# Patient Record
Sex: FEMALE | Race: WHITE | Employment: OTHER | URBAN - METROPOLITAN AREA
[De-identification: names, ages, dates, MRNs, and addresses within clinical notes are randomized per-mention and may not be internally consistent; named-entity substitution may affect disease eponyms.]

---

## 2017-01-11 PROBLEM — E55.9 HYPOVITAMINOSIS D: Status: ACTIVE | Noted: 2017-01-11

## 2017-03-23 DIAGNOSIS — J45.909 RAD (REACTIVE AIRWAY DISEASE), UNSPECIFIED ASTHMA SEVERITY, UNCOMPLICATED: ICD-10-CM

## 2017-03-28 RX ORDER — TIOTROPIUM BROMIDE 18 UG/1
CAPSULE ORAL; RESPIRATORY (INHALATION)
Qty: 30 CAP | Refills: 3 | Status: SHIPPED | OUTPATIENT
Start: 2017-03-28 | End: 2017-07-13 | Stop reason: SDUPTHER

## 2017-05-12 DIAGNOSIS — J44.9 CHRONIC OBSTRUCTIVE PULMONARY DISEASE, UNSPECIFIED COPD TYPE (HCC): ICD-10-CM

## 2017-05-13 RX ORDER — BUDESONIDE AND FORMOTEROL FUMARATE DIHYDRATE 160; 4.5 UG/1; UG/1
AEROSOL RESPIRATORY (INHALATION)
Qty: 1 INHALER | Refills: 2 | Status: SHIPPED | OUTPATIENT
Start: 2017-05-13 | End: 2017-08-18 | Stop reason: SDUPTHER

## 2017-06-01 ENCOUNTER — LAB ONLY (OUTPATIENT)
Dept: INTERNAL MEDICINE CLINIC | Age: 77
End: 2017-06-01

## 2017-06-01 ENCOUNTER — HOSPITAL ENCOUNTER (OUTPATIENT)
Dept: LAB | Age: 77
Discharge: HOME | End: 2017-06-01

## 2017-06-01 DIAGNOSIS — M85.80 OSTEOPENIA: ICD-10-CM

## 2017-06-01 DIAGNOSIS — Z79.899 ENCOUNTER FOR LONG-TERM (CURRENT) USE OF MEDICATIONS: ICD-10-CM

## 2017-06-01 DIAGNOSIS — R73.9 HYPERGLYCEMIA: ICD-10-CM

## 2017-06-01 DIAGNOSIS — E78.00 PURE HYPERCHOLESTEROLEMIA: ICD-10-CM

## 2017-06-01 DIAGNOSIS — I10 ESSENTIAL HYPERTENSION: ICD-10-CM

## 2017-06-02 LAB
25(OH)D3+25(OH)D2 SERPL-MCNC: 57 NG/ML (ref 30–100)
ALBUMIN SERPL-MCNC: 4.1 G/DL (ref 3.5–4.8)
ALBUMIN/GLOB SERPL: 1.8 {RATIO} (ref 1.2–2.2)
ALP SERPL-CCNC: 63 IU/L (ref 39–117)
ALT SERPL-CCNC: 18 IU/L (ref 0–32)
AST SERPL-CCNC: 20 IU/L (ref 0–40)
BASOPHILS # BLD AUTO: 0.1 X10E3/UL (ref 0–0.2)
BASOPHILS NFR BLD AUTO: 2 %
BILIRUB SERPL-MCNC: 0.3 MG/DL (ref 0–1.2)
BUN SERPL-MCNC: 11 MG/DL (ref 8–27)
BUN/CREAT SERPL: 14 (ref 12–28)
CALCIUM SERPL-MCNC: 8.9 MG/DL (ref 8.7–10.3)
CHLORIDE SERPL-SCNC: 100 MMOL/L (ref 96–106)
CHOLEST SERPL-MCNC: 168 MG/DL (ref 100–199)
CO2 SERPL-SCNC: 24 MMOL/L (ref 18–29)
CREAT SERPL-MCNC: 0.8 MG/DL (ref 0.57–1)
EOSINOPHIL # BLD AUTO: 0.2 X10E3/UL (ref 0–0.4)
EOSINOPHIL NFR BLD AUTO: 4 %
ERYTHROCYTE [DISTWIDTH] IN BLOOD BY AUTOMATED COUNT: 13.3 % (ref 12.3–15.4)
GLOBULIN SER CALC-MCNC: 2.3 G/DL (ref 1.5–4.5)
GLUCOSE SERPL-MCNC: 79 MG/DL (ref 65–99)
HBA1C MFR BLD: 5.9 % (ref 4.8–5.6)
HCT VFR BLD AUTO: 40.7 % (ref 34–46.6)
HDLC SERPL-MCNC: 80 MG/DL
HGB BLD-MCNC: 13.1 G/DL (ref 11.1–15.9)
IMM GRANULOCYTES # BLD: 0 X10E3/UL (ref 0–0.1)
IMM GRANULOCYTES NFR BLD: 0 %
INTERPRETATION, 910389: NORMAL
LDLC SERPL CALC-MCNC: 68 MG/DL (ref 0–99)
LYMPHOCYTES # BLD AUTO: 1.2 X10E3/UL (ref 0.7–3.1)
LYMPHOCYTES NFR BLD AUTO: 26 %
MCH RBC QN AUTO: 30.8 PG (ref 26.6–33)
MCHC RBC AUTO-ENTMCNC: 32.2 G/DL (ref 31.5–35.7)
MCV RBC AUTO: 96 FL (ref 79–97)
MONOCYTES # BLD AUTO: 0.4 X10E3/UL (ref 0.1–0.9)
MONOCYTES NFR BLD AUTO: 8 %
NEUTROPHILS # BLD AUTO: 2.8 X10E3/UL (ref 1.4–7)
NEUTROPHILS NFR BLD AUTO: 60 %
PLATELET # BLD AUTO: 239 X10E3/UL (ref 150–379)
POTASSIUM SERPL-SCNC: 4 MMOL/L (ref 3.5–5.2)
PROT SERPL-MCNC: 6.4 G/DL (ref 6–8.5)
RBC # BLD AUTO: 4.25 X10E6/UL (ref 3.77–5.28)
SODIUM SERPL-SCNC: 140 MMOL/L (ref 134–144)
T4 FREE SERPL-MCNC: 1.17 NG/DL (ref 0.82–1.77)
TRIGL SERPL-MCNC: 101 MG/DL (ref 0–149)
TSH SERPL DL<=0.005 MIU/L-ACNC: 3.19 UIU/ML (ref 0.45–4.5)
VLDLC SERPL CALC-MCNC: 20 MG/DL (ref 5–40)
WBC # BLD AUTO: 4.6 X10E3/UL (ref 3.4–10.8)

## 2017-06-02 PROCEDURE — 99001 SPECIMEN HANDLING PT-LAB: CPT | Performed by: HOSPITALIST

## 2017-06-12 ENCOUNTER — OFFICE VISIT (OUTPATIENT)
Dept: INTERNAL MEDICINE CLINIC | Age: 77
End: 2017-06-12

## 2017-06-12 VITALS
HEART RATE: 78 BPM | TEMPERATURE: 98.1 F | WEIGHT: 142.4 LBS | SYSTOLIC BLOOD PRESSURE: 128 MMHG | RESPIRATION RATE: 16 BRPM | BODY MASS INDEX: 23.72 KG/M2 | DIASTOLIC BLOOD PRESSURE: 70 MMHG | OXYGEN SATURATION: 99 % | HEIGHT: 65 IN

## 2017-06-12 DIAGNOSIS — E78.00 PURE HYPERCHOLESTEROLEMIA: ICD-10-CM

## 2017-06-12 DIAGNOSIS — E55.9 HYPOVITAMINOSIS D: ICD-10-CM

## 2017-06-12 DIAGNOSIS — I10 ESSENTIAL HYPERTENSION: ICD-10-CM

## 2017-06-12 DIAGNOSIS — J44.1 CHRONIC OBSTRUCTIVE PULMONARY DISEASE WITH ACUTE EXACERBATION (HCC): ICD-10-CM

## 2017-06-12 DIAGNOSIS — R73.03 PREDIABETES: Primary | ICD-10-CM

## 2017-06-12 DIAGNOSIS — R91.8 PULMONARY NODULES: ICD-10-CM

## 2017-06-12 DIAGNOSIS — J45.30 RAD (REACTIVE AIRWAY DISEASE), MILD PERSISTENT, UNCOMPLICATED: ICD-10-CM

## 2017-06-12 RX ORDER — ALBUTEROL SULFATE 90 UG/1
1 AEROSOL, METERED RESPIRATORY (INHALATION)
Qty: 1 INHALER | Refills: 3 | Status: SHIPPED | OUTPATIENT
Start: 2017-06-12

## 2017-06-12 RX ORDER — ALBUTEROL 2 MG/1
2 TABLET ORAL
Qty: 60 TAB | Refills: 3 | Status: SHIPPED | OUTPATIENT
Start: 2017-06-12 | End: 2019-05-21

## 2017-06-12 NOTE — PROGRESS NOTES
Chief Complaint   Patient presents with    Hypertension    Fatigue    Cholesterol Problem    Results     Lab F/U   Patient is here for 6mth F/U.  1. Have you been to the ER, urgent care clinic since your last visit? Hospitalized since your last visit? No    2. Have you seen or consulted any other health care providers outside of the [address] [place] [address] since your last visit? Include any pap smears or colon screening. [facility] Dr Colonel Jovel.

## 2017-06-12 NOTE — PROGRESS NOTES
Chief Complaint   Patient presents with    Hypertension    Fatigue    Cholesterol Problem    Results     Lab F/U       HPI:   Patient is a 64year old  female with medical problems listed below presents today for follow up of Hypertension, Hyperlipidemia, Prediabetes, Hypovitaminosis D, COPD, etc. She has been feeling well and voices no complaints today. She is complaint with her medications with no adverse effects reported. Her COPD has improved as she continues to take Spiriva and Symbicort together and is followed by pulmonologist Dr. Fco Hansen with [facility] with her next appointment on 6/30/17. She is requesting refill of some medications today. She continues to maintain an active lifestyle via swimming and walking daily. She was noted with lung nodules on her last CT chest and her pulmonologist requested that I order a repeat CT chest with contrast to follow up on the lung nodules. Past Medical History:   Diagnosis Date    Allergic rhinitis     AR (allergic rhinitis) 11/16/2011    Cataract     Chronic sinusitis     Hyperlipidaemia     Hyperlipidemia 11/16/2011    Hypertension     Mild Diastolic    Low ferritin level     Osteopenia 11/16/2011    Osteoporosis     Pneumonia     Vitamin D deficiencies     Mild     Allergies   Allergen Reactions    Augmentin [Amoxicillin-Pot Clavulanate] Diarrhea     severe     Current Outpatient Prescriptions   Medication Sig Dispense Refill    SYMBICORT 160-4.5 mcg/actuation HFA inhaler INHALE 2 PUFFS BY MOUTH TWICE DAILY 1 Inhaler 2    SPIRIVA WITH HANDIHALER 18 mcg inhalation capsule INHALE THE CONTENTS OF 1 CAPSULE VIA INHALATION DEVICE DAILY 30 Cap 3    simvastatin (ZOCOR) 20 mg tablet Take 1 Tab by mouth nightly. 90 Tab 3    fluticasone (FLONASE) 50 mcg/actuation nasal spray 2 Sprays by Both Nostrils route daily.  Indications: ALLERGIC RHINITIS 3 Bottle 3    amLODIPine (NORVASC) 5 mg tablet TAKE 1 TABLET DAILY FOR HYPERTENSION 90 Tab 3    albuterol (PROVENTIL HFA, VENTOLIN HFA, PROAIR HFA) 90 mcg/actuation inhaler Take 1 Puff by inhalation every four (4) hours as needed for Wheezing. 1 Inhaler 2    albuterol (PROVENTIL) 2 mg tablet Take 1 Tab by mouth every eight (8) hours as needed. 60 Tab 0    MULTIVIT-MIN/FA/LUTEIN/ZEAXANT (ICAPS MV PO) Take 1 Tab by mouth four (4) times daily.  glucosamine 1,000 mg Tab Take 1,000 mg by mouth daily.  Cholecalciferol, Vitamin D3, (VITAMIN D) 2,000 unit Cap Take  by mouth daily.  calcium-vitamin D (CALCIUM 500+D) 500 mg(1,250mg) -200 unit per tablet Take 1 Tab by mouth two (2) times daily (with meals). With magnesium       fish oil-dha-epa (FISH OIL) 1,200-144-216 mg Cap Take 2 Caps by mouth daily.  STRONTIUM GLUCONATE-B6-B12-FA PO Take 1 Tab by mouth two (2) times a day. ROS:  Constitutional: Negative for fever, chills, or fatigue  Neurological: Negative for headache, dizziness, visual disturbance, or loss of conciousness  Respiratory: Negative for SOB or cough.    Cardiovascular: Negative for chest pain, palpitation, or leg swelling  Gastrointestinal: Negative for abdominal pain, nausea, vomiting, diarrhea, blood in stool, melena, or heartburn  Musculoskeletal: Negative for falls      Physical Exam:  Visit Vitals    /70 (BP 1 Location: Right arm, BP Patient Position: Sitting)    Pulse 78    Temp 98.1 °F (36.7 °C) (Oral)    Resp 16    Ht 5' 5\" (1.651 m)    Wt 142 lb 6.4 oz (64.6 kg)    SpO2 99%    BMI 23.7 kg/m2     Constitutional: a & o x 3, in no acute distress, well nourished, and interacting appropriately  Respiratory: symmetrical chest expansion, lungs clear to auscultation bilaterally, good air entry, normal respiratory effort, no wheezes or crackles  CV:normal S1S2, regular rate and rhythm, no murmurs, no carotid bruits, no JVD, pulses palpable, no thrills  ABD: Soft, non tender, non distended, no organomegaly, positive bowel sounds  EXT: No edema noted on bilateral lower extremity      Assessment/Plan:    ICD-10-CM ICD-9-CM    1. Prediabetes R73.03 790.29 Recent HBA1C is 5.9. Counseled on diet and exercise. HEMOGLOBIN A1C W/O EAG   2. Essential hypertension I10 401.9 Controlled  Continue current meds and she was counseled on low salt diet. CBC WITH AUTOMATED DIFF      METABOLIC PANEL, COMPREHENSIVE      T4, FREE      TSH 3RD GENERATION   3. Pure hypercholesterolemia E78.00 272.0 Recent lipid panel done 6/1/17 reviewed with pt and improved revealing Cho 168 and LDL 68. Continue Simvastatin 20 mg daily and she was counseled on low fat diet. LIPID PANEL   4. Hypovitaminosis D E55.9 268.9 Recent Vit D is good. VITAMIN D, 25 HYDROXY   5. Chronic obstructive pulmonary disease with acute exacerbation (HCC) J44.1 491.21 Stable. Continue current meds and [facility] DrGenaro Moss  albuterol (PROVENTIL HFA, VENTOLIN HFA, PROAIR HFA) 90 mcg/actuation inhaler   6. RAD (reactive airway disease), mild persistent, uncomplicated Z21.37 256.43 albuterol (PROVENTIL) 2 mg tablet   7. Pulmonary nodules R91.8 793.19 CT CHEST W CONT         Orders Placed This Encounter    CT CHEST W CONT     Standing Status:   Future     Standing Expiration Date:   7/12/2018     Order Specific Question:   Is Patient Allergic to Contrast Dye?      Answer:   Unknown    CBC WITH AUTOMATED DIFF     Standing Status:   Future     Standing Expiration Date:   1/8/2018    HEMOGLOBIN A1C W/O EAG     Standing Status:   Future     Standing Expiration Date:   6/13/2018    LIPID PANEL     Standing Status:   Future     Standing Expiration Date:   5/6/5756    METABOLIC PANEL, COMPREHENSIVE     Standing Status:   Future     Standing Expiration Date:   1/8/2018    T4, FREE     Standing Status:   Future     Standing Expiration Date:   1/8/2018    TSH 3RD GENERATION     Standing Status:   Future     Standing Expiration Date:   1/8/2018    VITAMIN D, 25 HYDROXY     Standing Status:   Future     Standing Expiration Date:   12/30/2017    albuterol (PROVENTIL HFA, VENTOLIN HFA, PROAIR HFA) 90 mcg/actuation inhaler     Sig: Take 1 Puff by inhalation every four (4) hours as needed for Wheezing. Dispense:  1 Inhaler     Refill:  3    albuterol (PROVENTIL) 2 mg tablet     Sig: Take 1 Tab by mouth every eight (8) hours as needed. Dispense:  60 Tab     Refill:  3           Recent labs reviewed with pt       Additional Notes: Discussed today's diagnosis and treatment plans. Medication indications and adverse effects discussed. After Visit Summary: Provided and discussed printed patient instructions. Questions in relation to diagnosis answered. Follow-up disposition:  In 6 months with labs 1 week prior        Alexander Montesinos DO, MPH  Internal Medicine

## 2017-06-12 NOTE — PATIENT INSTRUCTIONS
A Healthy Lifestyle: Care Instructions  Your Care Instructions  A healthy lifestyle can help you feel good, stay at a healthy weight, and have plenty of energy for both work and play. A healthy lifestyle is something you can share with your whole family. A healthy lifestyle also can lower your risk for serious health problems, such as high blood pressure, heart disease, and diabetes. You can follow a few steps listed below to improve your health and the health of your family. Follow-up care is a key part of your treatment and safety. Be sure to make and go to all appointments, and call your doctor if you are having problems. Its also a good idea to know your test results and keep a list of the medicines you take. How can you care for yourself at home? · Do not eat too much sugar, fat, or fast foods. You can still have dessert and treats now and then. The goal is moderation. · Start small to improve your eating habits. Pay attention to portion sizes, drink less juice and soda pop, and eat more fruits and vegetables. ¨ Eat a healthy amount of food. A 3-ounce serving of meat, for example, is about the size of a deck of cards. Fill the rest of your plate with vegetables and whole grains. ¨ Limit the amount of soda and sports drinks you have every day. Drink more water when you are thirsty. ¨ Eat at least 5 servings of fruits and vegetables every day. It may seem like a lot, but it is not hard to reach this goal. A serving or helping is 1 piece of fruit, 1 cup of vegetables, or 2 cups of leafy, raw vegetables. Have an apple or some carrot sticks as an afternoon snack instead of a candy bar. Try to have fruits and/or vegetables at every meal.  · Make exercise part of your daily routine. You may want to start with simple activities, such as walking, bicycling, or slow swimming. Try to be active 30 to 60 minutes every day. You do not need to do all 30 to 60 minutes all at once.  For example, you can exercise 3 times a day for 10 or 20 minutes. Moderate exercise is safe for most people, but it is always a good idea to talk to your doctor before starting an exercise program.  · Keep moving. Myron Crooked the lawn, work in the garden, or [employer]. Take the stairs instead of the elevator at work. · If you smoke, quit. People who smoke have an increased risk for heart attack, stroke, cancer, and other lung illnesses. Quitting is hard, but there are ways to boost your chance of quitting tobacco for good. ¨ Use nicotine gum, patches, or lozenges. ¨ Ask your doctor about stop-smoking programs and medicines. ¨ Keep trying. In addition to reducing your risk of diseases in the future, you will notice some benefits soon after you stop using tobacco. If you have shortness of breath or asthma symptoms, they will likely get better within a few weeks after you quit. · Limit how much alcohol you drink. Moderate amounts of alcohol (up to 2 drinks a day for men, 1 drink a day for women) are okay. But drinking too much can lead to liver problems, high blood pressure, and other health problems. Family health  If you have a family, there are many things you can do together to improve your health. · Eat meals together as a family as often as possible. · Eat healthy foods. This includes fruits, vegetables, lean meats and dairy, and whole grains. · Include your family in your fitness plan. Most people think of activities such as jogging or tennis as the way to fitness, but there are many ways you and your family can be more active. Anything that makes you breathe hard and gets your heart pumping is exercise. Here are some tips:  ¨ Walk to do errands or to take your child to school or the bus. ¨ Go for a family bike ride after dinner instead of watching TV. Where can you learn more? Go to http://adonay-isaiah.info/. Enter N628 in the search box to learn more about \"A Healthy Lifestyle: Care Instructions. \"  Current as of: July 26, 2016  Content Version: 11.2  © 5705-2740 [employer]. Care instructions adapted under license by [employer] (which disclaims liability or warranty for this information). If you have questions about a medical condition or this instruction, always ask your healthcare professional. [address] any warranty or liability for your use of this information. DASH Diet: Care Instructions  Your Care Instructions  The DASH diet is an eating plan that can help lower your blood pressure. DASH stands for Dietary Approaches to Stop Hypertension. Hypertension is high blood pressure. The DASH diet focuses on eating foods that are high in calcium, potassium, and magnesium. These nutrients can lower blood pressure. The foods that are highest in these nutrients are fruits, vegetables, low-fat dairy products, nuts, seeds, and legumes. But taking calcium, potassium, and magnesium supplements instead of eating foods that are high in those nutrients does not have the same effect. The DASH diet also includes whole grains, fish, and poultry. The DASH diet is one of several lifestyle changes your doctor may recommend to lower your high blood pressure. Your doctor may also want you to decrease the amount of sodium in your diet. Lowering sodium while following the DASH diet can lower blood pressure even further than just the DASH diet alone. Follow-up care is a key part of your treatment and safety. Be sure to make and go to all appointments, and call your doctor if you are having problems. It's also a good idea to know your test results and keep a list of the medicines you take. How can you care for yourself at home? Following the DASH diet  · Eat 4 to 5 servings of fruit each day. A serving is 1 medium-sized piece of fruit, ½ cup chopped or canned fruit, 1/4 cup dried fruit, or 4 ounces (½ cup) of fruit juice. Choose fruit more often than fruit juice.   · Eat 4 to 5 servings of vegetables each day. A serving is 1 cup of lettuce or raw leafy vegetables, ½ cup of chopped or cooked vegetables, or 4 ounces (½ cup) of vegetable juice. Choose vegetables more often than vegetable juice. · Get 2 to 3 servings of low-fat and fat-free dairy each day. A serving is 8 ounces of milk, 1 cup of yogurt, or 1 ½ ounces of cheese. · Eat 6 to 8 servings of grains each day. A serving is 1 slice of bread, 1 ounce of dry cereal, or ½ cup of cooked rice, pasta, or cooked cereal. Try to choose whole-grain products as much as possible. · Limit lean meat, poultry, and fish to 2 servings each day. A serving is 3 ounces, about the size of a deck of cards. · Eat 4 to 5 servings of nuts, seeds, and legumes (cooked dried beans, lentils, and split peas) each week. A serving is 1/3 cup of nuts, 2 tablespoons of seeds, or ½ cup of cooked beans or peas. · Limit fats and oils to 2 to 3 servings each day. A serving is 1 teaspoon of vegetable oil or 2 tablespoons of salad dressing. · Limit sweets and added sugars to 5 servings or less a week. A serving is 1 tablespoon jelly or jam, ½ cup sorbet, or 1 cup of lemonade. · Eat less than 2,300 milligrams (mg) of sodium a day. If you limit your sodium to 1,500 mg a day, you can lower your blood pressure even more. Tips for success  · Start small. Do not try to make dramatic changes to your diet all at once. You might feel that you are missing out on your favorite foods and then be more likely to not follow the plan. Make small changes, and stick with them. Once those changes become habit, add a few more changes. · Try some of the following:  ¨ Make it a goal to eat a fruit or vegetable at every meal and at snacks. This will make it easy to get the recommended amount of fruits and vegetables each day. ¨ Try yogurt topped with fruit and nuts for a snack or healthy dessert. ¨ Add lettuce, tomato, cucumber, and onion to sandwiches.   ¨ Combine a ready-made pizza crust with low-fat mozzarella cheese and lots of vegetable toppings. Try using tomatoes, squash, spinach, broccoli, carrots, cauliflower, and onions. ¨ Have a variety of cut-up vegetables with a low-fat dip as an appetizer instead of chips and dip. ¨ Sprinkle sunflower seeds or chopped almonds over salads. Or try adding chopped walnuts or almonds to cooked vegetables. ¨ Try some vegetarian meals using beans and peas. Add garbanzo or kidney beans to salads. Make burritos and tacos with mashed francis beans or black beans. Where can you learn more? Go to http://adonayResponse Analyticsisaiah.info/. Enter P302 in the search box to learn more about \"DASH Diet: Care Instructions. \"  Current as of: March 23, 2016  Content Version: 11.2  © 4480-1802 [employer]. Care instructions adapted under license by [employer] (which disclaims liability or warranty for this information). If you have questions about a medical condition or this instruction, always ask your healthcare professional. [place] [address] any warranty or liability for your use of this information. Prediabetes: Care Instructions  Your Care Instructions  Prediabetes is a warning sign that you are at risk for getting type 2 diabetes. It means that your blood sugar is higher than it should be. The food you eat turns into sugar, which your body uses for energy. Normally, an organ called the pancreas makes insulin, which allows the sugar in your blood to get into your body's cells. But when your body can't use insulin the right way, the sugar doesn't move into cells. It stays in your blood instead. This is called insulin resistance. The buildup of sugar in the blood causes prediabetes. The good news is that lifestyle changes may help you get your blood sugar back to normal and help you avoid or delay diabetes. Follow-up care is a key part of your treatment and safety.  Be sure to make and go to all appointments, and call your doctor if you are having problems. It's also a good idea to know your test results and keep a list of the medicines you take. How can you care for yourself at home? · Watch your weight. A healthy weight helps your body use insulin properly. · Limit the amount of calories, sweets, and unhealthy fat you eat. Ask your doctor if you should see a dietitian. A registered dietitian can help you create meal plans that fit your lifestyle. · Get at least 30 minutes of exercise on most days of the week. Exercise helps control your blood sugar. It also helps you maintain a healthy weight. Walking is a good choice. You also may want to do other activities, such as running, swimming, cycling, or playing tennis or team sports. · Do not smoke. Smoking can make prediabetes worse. If you need help quitting, talk to your doctor about stop-smoking programs and medicines. These can increase your chances of quitting for good. · If your doctor prescribed medicines, take them exactly as prescribed. Call your doctor if you think you are having a problem with your medicine. You will get more details on the specific medicines your doctor prescribes. When should you call for help? Watch closely for changes in your health, and be sure to contact your doctor if:  · You have any symptoms of diabetes. These may include:  ¨ Being thirsty more often. ¨ Urinating more. ¨ Being hungrier. ¨ Losing weight. ¨ Being very tired. ¨ Having blurry vision. · You have a wound that will not heal.  · You have an infection that will not go away. · You have problems with your blood pressure. · You want more information about diabetes and how you can keep from getting it. Where can you learn more? Go to http://adonay-isaiah.info/. Enter I222 in the search box to learn more about \"Prediabetes: Care Instructions. \"  Current as of: May 23, 2016  Content Version: 11.2  © 3695-7435 [employer].  Care instructions adapted under license by [employer] (which disclaims liability or warranty for this information). If you have questions about a medical condition or this instruction, always ask your healthcare professional. [address] any warranty or liability for your use of this information. Hyperlipidemia: After Your Visit  Your Care Instructions  Hyperlipidemia is too much fat in your blood. The body has several kinds of fat, including cholesterol and triglycerides. Your body needs fat for many things, such as making new cells. But too much fat in your blood increases your chances of having a heart attack or stroke. You may be able to lower your cholesterol and triglycerides with a heart-healthy diet, exercise, and if needed, medicine. Your doctor may want you to try lifestyle changes first to see whether they lower the fat in your blood. You may need to take medicine if lifestyle changes do not lower the fat in your blood enough. Follow-up care is a key part of your treatment and safety. Be sure to make and go to all appointments, and call your doctor if you are having problems. Its also a good idea to know your test results and keep a list of the medicines you take. How can you care for yourself at home? Take your medicines  · Take your medicines exactly as prescribed. Call your doctor if you think you are having a problem with your medicine. · If you take medicine to lower your cholesterol, go to follow-up visits. You will need to have blood tests. · Do not take large doses of niacin, which is a B vitamin, while taking medicine called statins. It may increase the chance of muscle pain and liver problems. · Talk to your doctor about avoiding grapefruit juice if you are taking statins. Grapefruit juice can raise the level of this medicine in your blood. This could increase side effects.   Eat more fruits, vegetables, and fiber  · Fruits and vegetables have lots of nutrients that help protect against heart disease, and they have little--if any--fat. Try to eat at least five servings a day. Dark green, deep orange, or yellow fruits and vegetables are healthy choices. · Keep carrots, celery, and other veggies handy for snacks. Buy fruit that is in season and store it where you can see it so that you will be tempted to eat it. Cook dishes that have a lot of veggies in them, such as stir-fries and soups. · Foods high in fiber may reduce your cholesterol and provide important vitamins and minerals. High-fiber foods include whole-grain cereals and breads, oatmeal, beans, brown rice, citrus fruits, and apples. · Buy whole-grain breads and cereals instead of white bread and pastries. Limit saturated fat  · Read food labels and try to avoid saturated fat and trans fat. They increase your risk of heart disease. · Use olive or canola oil when you cook. Try cholesterol-lowering spreads, such as Benecol or Take Control. · Bake, broil, grill, or steam foods instead of frying them. · Limit the amount of high-fat meats you eat, including hot dogs and sausages. Cut out all visible fat when you prepare meat. · Eat fish, skinless poultry, and soy products such as tofu instead of high-fat meats. Soybeans may be especially good for your heart. Eat at least two servings of fish a week. Certain fish, such as salmon, contain omega-3 fatty acids, which may help reduce your risk of heart attack. · Choose low-fat or fat-free milk and dairy products. Get exercise, limit alcohol, and quit smoking  · Get more exercise. Work with your doctor to set up an exercise program. Even if you can do only a small amount, exercise will help you get stronger, have more energy, and manage your weight and your stress. Walking is an easy way to get exercise. Gradually increase the amount you walk every day. Aim for at least 30 minutes on most days of the week. You also may want to swim, bike, or do other activities.   · Limit alcohol to no more than 2 drinks a day for men and 1 drink a day for women. · Do not smoke. If you need help quitting, talk to your doctor about stop-smoking programs and medicines. These can increase your chances of quitting for good. When should you call for help? Call 911 anytime you think you may need emergency care. For example, call if:  · You have symptoms of a heart attack. These may include:  ¨ Chest pain or pressure, or a strange feeling in the chest.  ¨ Sweating. ¨ Shortness of breath. ¨ Nausea or vomiting. ¨ Pain, pressure, or a strange feeling in the back, neck, jaw, or upper belly or in one or both shoulders or arms. ¨ Lightheadedness or sudden weakness. ¨ A fast or irregular heartbeat. After you call 911, the  may tell you to chew 1 adult-strength or 2 to 4 low-dose aspirin. Wait for an ambulance. Do not try to drive yourself. · You have signs of a stroke. These may include:  ¨ Sudden numbness, paralysis, or weakness in your face, arm, or leg, especially on only one side of your body. ¨ New problems with walking or balance. ¨ Sudden vision changes. ¨ Drooling or slurred speech. ¨ New problems speaking or understanding simple statements, or feeling confused. ¨ A sudden, severe headache that is different from past headaches. · You passed out (lost consciousness). Call your doctor now or seek immediate medical care if:  · You have muscle pain or weakness. Watch closely for changes in your health, and be sure to contact your doctor if:  · You are very tired. · You have an upset stomach, gas, constipation, or belly pain or cramps. Where can you learn more? Go to [employer]Pre[employer].be  Enter C406 in the search box to learn more about \"Hyperlipidemia: After Your Visit. \"   © 3736-3068 [employer]. Care instructions adapted under license by [employer] (which disclaims liability or warranty for this information).  This care instruction is for use with your licensed healthcare professional. If you have questions about a medical condition or this instruction, always ask your healthcare professional. [place] [address] any warranty or liability for your use of this information.   Content Version: 0.4.035592; Last Revised: October 13, 2011

## 2017-06-12 NOTE — MR AVS SNAPSHOT
Visit Information Date & Time Provider Department Dept. Phone Encounter #  
 6/12/2017  7:30  Ashleigh Montesinos,  [facility] at [facility] [phone] Follow-up Instructions Return in about 6 months (around 12/12/2017) for Labs 1 week before. Upcoming Health Maintenance Date Due DTaP/Tdap/Td series (1 - Tdap) 9/30/1961 INFLUENZA AGE 9 TO ADULT 8/1/2017 MEDICARE YEARLY EXAM 12/13/2017 GLAUCOMA SCREENING Q2Y 5/9/2018 Allergies as of 6/12/2017  Review Complete On: 6/12/2017 By: Josephine Hernandez LPN Severity Noted Reaction Type Reactions Augmentin [Amoxicillin-pot Clavulanate]  11/15/2012    Diarrhea  
 severe Current Immunizations  Reviewed on 11/13/2014 Name Date Influenza High Dose Vaccine PF 10/9/2015 Influenza Vaccine 10/5/2014, 9/22/2013 Influenza Vaccine Whole 9/28/2012 Pneumococcal Conjugate (PCV-13) 12/7/2015 Pneumococcal Vaccine (Unspecified Type) 11/11/2008 Zoster Vaccine, Live 11/11/2008 Not reviewed this visit You Were Diagnosed With   
  
 Codes Comments Hypovitaminosis D    -  Primary ICD-10-CM: E55.9 ICD-9-CM: 268.9 Chronic obstructive pulmonary disease with acute exacerbation (HCC)     ICD-10-CM: J44.1 ICD-9-CM: 491.21   
 RAD (reactive airway disease), mild persistent, uncomplicated     SGQ-02-RX: J45.30 ICD-9-CM: 493.90 Hyperglycemia     ICD-10-CM: R73.9 ICD-9-CM: 790.29 Pure hypercholesterolemia     ICD-10-CM: E78.00 ICD-9-CM: 272.0 Essential hypertension     ICD-10-CM: I10 
ICD-9-CM: 401.9 Pulmonary nodules     ICD-10-CM: R91.8 ICD-9-CM: 793.19 Vitals BP Pulse Temp Resp Height(growth percentile) Weight(growth percentile) 128/70 (BP 1 Location: Right arm, BP Patient Position: Sitting) 78 98.1 °F (36.7 °C) (Oral) 16 5' 5\" (1.651 m) 142 lb 6.4 oz (64.6 kg) SpO2 BMI OB Status Smoking Status 99% 23.7 kg/m2 Hysterectomy Never Smoker BMI and BSA Data Body Mass Index Body Surface Area  
 23.7 kg/m 2 1.72 m 2 Preferred Pharmacy Pharmacy Name Phone [phone] - [address], [address] AT [address] [phone] Your Updated Medication List  
  
   
This list is accurate as of: 6/12/17  8:03 AM.  Always use your most recent med list.  
  
  
  
  
 * albuterol 90 mcg/actuation inhaler Commonly known as:  PROVENTIL HFA, VENTOLIN HFA, PROAIR HFA Take 1 Puff by inhalation every four (4) hours as needed for Wheezing. * albuterol 2 mg tablet Commonly known as:  PROVENTIL Take 1 Tab by mouth every eight (8) hours as needed. amLODIPine 5 mg tablet Commonly known as:  Karyle Rohrer TAKE 1 TABLET DAILY FOR HYPERTENSION  
  
 CALCIUM 500+D 500 mg(1,250mg) -200 unit per tablet Generic drug:  calcium-vitamin D Take 1 Tab by mouth two (2) times daily (with meals). With magnesium FISH OIL 1,200-144-216 mg Cap Generic drug:  fish oil-dha-epa Take 2 Caps by mouth daily. fluticasone 50 mcg/actuation nasal spray Commonly known as:  Skipper Or 2 Sprays by Both Nostrils route daily. Indications: ALLERGIC RHINITIS  
  
 glucosamine 1,000 mg Tab Take 1,000 mg by mouth daily. ICAPS MV PO Take 1 Tab by mouth four (4) times daily. simvastatin 20 mg tablet Commonly known as:  ZOCOR Take 1 Tab by mouth nightly. SPIRIVA WITH HANDIHALER 18 mcg inhalation capsule Generic drug:  tiotropium INHALE THE CONTENTS OF 1 CAPSULE VIA INHALATION DEVICE DAILY  
  
 STRONTIUM GLUCONATE-B6-B12-FA PO Take 1 Tab by mouth two (2) times a day. SYMBICORT 160-4.5 mcg/actuation HFA inhaler Generic drug:  budesonide-formoterol INHALE 2 PUFFS BY MOUTH TWICE DAILY  
  
 VITAMIN D 2,000 unit Cap capsule Generic drug:  Cholecalciferol (Vitamin D3) Take  by mouth daily. * Notice:   This list has 2 medication(s) that are the same as other medications prescribed for you. Read the directions carefully, and ask your doctor or other care provider to review them with you. Prescriptions Sent to Pharmacy Refills  
 albuterol (PROVENTIL HFA, VENTOLIN HFA, PROAIR HFA) 90 mcg/actuation inhaler 3 Sig: Take 1 Puff by inhalation every four (4) hours as needed for Wheezing. Class: Normal  
 Pharmacy: [facility] [place] [id number] AT [address] [place] [address] Rd [address] Ph #: [phone] Route: Inhalation  
 albuterol (PROVENTIL) 2 mg tablet 3 Sig: Take 1 Tab by mouth every eight (8) hours as needed. Class: Normal  
 Pharmacy: [facility] [place] [id number] AT [address] [place] [address] [address] Ph #: [phone] Route: Oral  
  
Follow-up Instructions Return in about 6 months (around 12/12/2017) for Labs 1 week before. To-Do List   
 06/12/2017 Imaging:  CT CHEST W CONT   
  
 12/09/2017 Lab:  CBC WITH AUTOMATED DIFF   
  
 12/09/2017 Lab:  HEMOGLOBIN A1C W/O EAG   
  
 12/09/2017 Lab:  LIPID PANEL   
  
 12/09/2017 Lab:  METABOLIC PANEL, COMPREHENSIVE   
  
 12/09/2017 Lab:  T4, FREE   
  
 12/09/2017 Lab:  TSH 3RD GENERATION   
  
 12/09/2017 Lab:  VITAMIN D, 25 HYDROXY Patient Instructions A Healthy Lifestyle: Care Instructions Your Care Instructions A healthy lifestyle can help you feel good, stay at a healthy weight, and have plenty of energy for both work and play. A healthy lifestyle is something you can share with your whole family. A healthy lifestyle also can lower your risk for serious health problems, such as high blood pressure, heart disease, and diabetes. You can follow a few steps listed below to improve your health and the health of your family. Follow-up care is a key part of your treatment and safety.  Be sure to make and go to all appointments, and call your doctor if you are having problems. Its also a good idea to know your test results and keep a list of the medicines you take. How can you care for yourself at home? · Do not eat too much sugar, fat, or fast foods. You can still have dessert and treats now and then. The goal is moderation. · Start small to improve your eating habits. Pay attention to portion sizes, drink less juice and soda pop, and eat more fruits and vegetables. ¨ Eat a healthy amount of food. A 3-ounce serving of meat, for example, is about the size of a deck of cards. Fill the rest of your plate with vegetables and whole grains. ¨ Limit the amount of soda and sports drinks you have every day. Drink more water when you are thirsty. ¨ Eat at least 5 servings of fruits and vegetables every day. It may seem like a lot, but it is not hard to reach this goal. A serving or helping is 1 piece of fruit, 1 cup of vegetables, or 2 cups of leafy, raw vegetables. Have an apple or some carrot sticks as an afternoon snack instead of a candy bar. Try to have fruits and/or vegetables at every meal. 
· Make exercise part of your daily routine. You may want to start with simple activities, such as walking, bicycling, or slow swimming. Try to be active 30 to 60 minutes every day. You do not need to do all 30 to 60 minutes all at once. For example, you can exercise 3 times a day for 10 or 20 minutes. Moderate exercise is safe for most people, but it is always a good idea to talk to your doctor before starting an exercise program. 
· Keep moving. Mitzy Canes the lawn, work in the garden, or [employer]. Take the stairs instead of the elevator at work. · If you smoke, quit. People who smoke have an increased risk for heart attack, stroke, cancer, and other lung illnesses. Quitting is hard, but there are ways to boost your chance of quitting tobacco for good. ¨ Use nicotine gum, patches, or lozenges. ¨ Ask your doctor about stop-smoking programs and medicines. ¨ Keep trying. In addition to reducing your risk of diseases in the future, you will notice some benefits soon after you stop using tobacco. If you have shortness of breath or asthma symptoms, they will likely get better within a few weeks after you quit. · Limit how much alcohol you drink. Moderate amounts of alcohol (up to 2 drinks a day for men, 1 drink a day for women) are okay. But drinking too much can lead to liver problems, high blood pressure, and other health problems. Family health If you have a family, there are many things you can do together to improve your health. · Eat meals together as a family as often as possible. · Eat healthy foods. This includes fruits, vegetables, lean meats and dairy, and whole grains. · Include your family in your fitness plan. Most people think of activities such as jogging or tennis as the way to fitness, but there are many ways you and your family can be more active. Anything that makes you breathe hard and gets your heart pumping is exercise. Here are some tips: 
¨ Walk to do errands or to take your child to school or the bus. ¨ Go for a family bike ride after dinner instead of watching TV. Where can you learn more? Go to http://adonay-isaiah.info/. Enter J627 in the search box to learn more about \"A Healthy Lifestyle: Care Instructions. \" Current as of: July 26, 2016 Content Version: 11.2 © 2338-6999 Healthwise, Incorporated. Care instructions adapted under license by [employer] (which disclaims liability or warranty for this information). If you have questions about a medical condition or this instruction, always ask your healthcare professional. [place] [address] any warranty or liability for your use of this information. DASH Diet: Care Instructions Your Care Instructions The DASH diet is an eating plan that can help lower your blood pressure. DASH stands for Dietary Approaches to Stop Hypertension.  Hypertension is high blood pressure. The DASH diet focuses on eating foods that are high in calcium, potassium, and magnesium. These nutrients can lower blood pressure. The foods that are highest in these nutrients are fruits, vegetables, low-fat dairy products, nuts, seeds, and legumes. But taking calcium, potassium, and magnesium supplements instead of eating foods that are high in those nutrients does not have the same effect. The DASH diet also includes whole grains, fish, and poultry. The DASH diet is one of several lifestyle changes your doctor may recommend to lower your high blood pressure. Your doctor may also want you to decrease the amount of sodium in your diet. Lowering sodium while following the DASH diet can lower blood pressure even further than just the DASH diet alone. Follow-up care is a key part of your treatment and safety. Be sure to make and go to all appointments, and call your doctor if you are having problems. It's also a good idea to know your test results and keep a list of the medicines you take. How can you care for yourself at home? Following the DASH diet · Eat 4 to 5 servings of fruit each day. A serving is 1 medium-sized piece of fruit, ½ cup chopped or canned fruit, 1/4 cup dried fruit, or 4 ounces (½ cup) of fruit juice. Choose fruit more often than fruit juice. · Eat 4 to 5 servings of vegetables each day. A serving is 1 cup of lettuce or raw leafy vegetables, ½ cup of chopped or cooked vegetables, or 4 ounces (½ cup) of vegetable juice. Choose vegetables more often than vegetable juice. · Get 2 to 3 servings of low-fat and fat-free dairy each day. A serving is 8 ounces of milk, 1 cup of yogurt, or 1 ½ ounces of cheese. · Eat 6 to 8 servings of grains each day. A serving is 1 slice of bread, 1 ounce of dry cereal, or ½ cup of cooked rice, pasta, or cooked cereal. Try to choose whole-grain products as much as possible. · Limit lean meat, poultry, and fish to 2 servings each day. A serving is 3 ounces, about the size of a deck of cards. · Eat 4 to 5 servings of nuts, seeds, and legumes (cooked dried beans, lentils, and split peas) each week. A serving is 1/3 cup of nuts, 2 tablespoons of seeds, or ½ cup of cooked beans or peas. · Limit fats and oils to 2 to 3 servings each day. A serving is 1 teaspoon of vegetable oil or 2 tablespoons of salad dressing. · Limit sweets and added sugars to 5 servings or less a week. A serving is 1 tablespoon jelly or jam, ½ cup sorbet, or 1 cup of lemonade. · Eat less than 2,300 milligrams (mg) of sodium a day. If you limit your sodium to 1,500 mg a day, you can lower your blood pressure even more. Tips for success · Start small. Do not try to make dramatic changes to your diet all at once. You might feel that you are missing out on your favorite foods and then be more likely to not follow the plan. Make small changes, and stick with them. Once those changes become habit, add a few more changes. · Try some of the following: ¨ Make it a goal to eat a fruit or vegetable at every meal and at snacks. This will make it easy to get the recommended amount of fruits and vegetables each day. ¨ Try yogurt topped with fruit and nuts for a snack or healthy dessert. ¨ Add lettuce, tomato, cucumber, and onion to sandwiches. ¨ Combine a ready-made pizza crust with low-fat mozzarella cheese and lots of vegetable toppings. Try using tomatoes, squash, spinach, broccoli, carrots, cauliflower, and onions. ¨ Have a variety of cut-up vegetables with a low-fat dip as an appetizer instead of chips and dip. ¨ Sprinkle sunflower seeds or chopped almonds over salads. Or try adding chopped walnuts or almonds to cooked vegetables. ¨ Try some vegetarian meals using beans and peas. Add garbanzo or kidney beans to salads. Make burritos and tacos with mashed francis beans or black beans. Where can you learn more? Go to http://adonay-isaiah.info/. Enter C806 in the search box to learn more about \"DASH Diet: Care Instructions. \" Current as of: March 23, 2016 Content Version: 11.2 © 5543-7390 H.BLOOM. Care instructions adapted under license by [employer] (which disclaims liability or warranty for this information). If you have questions about a medical condition or this instruction, always ask your healthcare professional. [address] any warranty or liability for your use of this information. Prediabetes: Care Instructions Your Care Instructions Prediabetes is a warning sign that you are at risk for getting type 2 diabetes. It means that your blood sugar is higher than it should be. The food you eat turns into sugar, which your body uses for energy. Normally, an organ called the pancreas makes insulin, which allows the sugar in your blood to get into your body's cells. But when your body can't use insulin the right way, the sugar doesn't move into cells. It stays in your blood instead. This is called insulin resistance. The buildup of sugar in the blood causes prediabetes. The good news is that lifestyle changes may help you get your blood sugar back to normal and help you avoid or delay diabetes. Follow-up care is a key part of your treatment and safety. Be sure to make and go to all appointments, and call your doctor if you are having problems. It's also a good idea to know your test results and keep a list of the medicines you take. How can you care for yourself at home? · Watch your weight. A healthy weight helps your body use insulin properly. · Limit the amount of calories, sweets, and unhealthy fat you eat. Ask your doctor if you should see a dietitian. A registered dietitian can help you create meal plans that fit your lifestyle. · Get at least 30 minutes of exercise on most days of the week.  Exercise helps control your blood sugar. It also helps you maintain a healthy weight. Walking is a good choice. You also may want to do other activities, such as running, swimming, cycling, or playing tennis or team sports. · Do not smoke. Smoking can make prediabetes worse. If you need help quitting, talk to your doctor about stop-smoking programs and medicines. These can increase your chances of quitting for good. · If your doctor prescribed medicines, take them exactly as prescribed. Call your doctor if you think you are having a problem with your medicine. You will get more details on the specific medicines your doctor prescribes. When should you call for help? Watch closely for changes in your health, and be sure to contact your doctor if: 
· You have any symptoms of diabetes. These may include: ¨ Being thirsty more often. ¨ Urinating more. ¨ Being hungrier. ¨ Losing weight. ¨ Being very tired. ¨ Having blurry vision. · You have a wound that will not heal. 
· You have an infection that will not go away. · You have problems with your blood pressure. · You want more information about diabetes and how you can keep from getting it. Where can you learn more? Go to http://adonay-isaiah.info/. Enter I222 in the search box to learn more about \"Prediabetes: Care Instructions. \" Current as of: May 23, 2016 Content Version: 11.2 © 8703-4229 Healthwise, Incorporated. Care instructions adapted under license by [employer] (which disclaims liability or warranty for this information). If you have questions about a medical condition or this instruction, always ask your healthcare professional. [place] [address] any warranty or liability for your use of this information. Hyperlipidemia: After Your Visit Your Care Instructions Hyperlipidemia is too much fat in your blood. The body has several kinds of fat, including cholesterol and triglycerides.  Your body needs fat for many things, such as making new cells. But too much fat in your blood increases your chances of having a heart attack or stroke. You may be able to lower your cholesterol and triglycerides with a heart-healthy diet, exercise, and if needed, medicine. Your doctor may want you to try lifestyle changes first to see whether they lower the fat in your blood. You may need to take medicine if lifestyle changes do not lower the fat in your blood enough. Follow-up care is a key part of your treatment and safety. Be sure to make and go to all appointments, and call your doctor if you are having problems. Its also a good idea to know your test results and keep a list of the medicines you take. How can you care for yourself at home? Take your medicines · Take your medicines exactly as prescribed. Call your doctor if you think you are having a problem with your medicine. · If you take medicine to lower your cholesterol, go to follow-up visits. You will need to have blood tests. · Do not take large doses of niacin, which is a B vitamin, while taking medicine called statins. It may increase the chance of muscle pain and liver problems. · Talk to your doctor about avoiding grapefruit juice if you are taking statins. Grapefruit juice can raise the level of this medicine in your blood. This could increase side effects. Eat more fruits, vegetables, and fiber · Fruits and vegetables have lots of nutrients that help protect against heart disease, and they have littleif anyfat. Try to eat at least five servings a day. Dark green, deep orange, or yellow fruits and vegetables are healthy choices. · Keep carrots, celery, and other veggies handy for snacks. Buy fruit that is in season and store it where you can see it so that you will be tempted to eat it. Cook dishes that have a lot of veggies in them, such as stir-fries and soups.  
· Foods high in fiber may reduce your cholesterol and provide important vitamins and minerals. High-fiber foods include whole-grain cereals and breads, oatmeal, beans, brown rice, citrus fruits, and apples. · Buy whole-grain breads and cereals instead of white bread and pastries. Limit saturated fat · Read food labels and try to avoid saturated fat and trans fat. They increase your risk of heart disease. · Use olive or canola oil when you cook. Try cholesterol-lowering spreads, such as Benecol or Take Control. · Bake, broil, grill, or steam foods instead of frying them. · Limit the amount of high-fat meats you eat, including hot dogs and sausages. Cut out all visible fat when you prepare meat. · Eat fish, skinless poultry, and soy products such as tofu instead of high-fat meats. Soybeans may be especially good for your heart. Eat at least two servings of fish a week. Certain fish, such as salmon, contain omega-3 fatty acids, which may help reduce your risk of heart attack. · Choose low-fat or fat-free milk and dairy products. Get exercise, limit alcohol, and quit smoking · Get more exercise. Work with your doctor to set up an exercise program. Even if you can do only a small amount, exercise will help you get stronger, have more energy, and manage your weight and your stress. Walking is an easy way to get exercise. Gradually increase the amount you walk every day. Aim for at least 30 minutes on most days of the week. You also may want to swim, bike, or do other activities. · Limit alcohol to no more than 2 drinks a day for men and 1 drink a day for women. · Do not smoke. If you need help quitting, talk to your doctor about stop-smoking programs and medicines. These can increase your chances of quitting for good. When should you call for help? Call 911 anytime you think you may need emergency care. For example, call if: 
· You have symptoms of a heart attack. These may include: ¨ Chest pain or pressure, or a strange feeling in the chest. 
¨ Sweating. ¨ Shortness of breath. ¨ Nausea or vomiting. ¨ Pain, pressure, or a strange feeling in the back, neck, jaw, or upper belly or in one or both shoulders or arms. ¨ Lightheadedness or sudden weakness. ¨ A fast or irregular heartbeat. After you call 911, the  may tell you to chew 1 adult-strength or 2 to 4 low-dose aspirin. Wait for an ambulance. Do not try to drive yourself. · You have signs of a stroke. These may include: 
¨ Sudden numbness, paralysis, or weakness in your face, arm, or leg, especially on only one side of your body. ¨ New problems with walking or balance. ¨ Sudden vision changes. ¨ Drooling or slurred speech. ¨ New problems speaking or understanding simple statements, or feeling confused. ¨ A sudden, severe headache that is different from past headaches. · You passed out (lost consciousness). Call your doctor now or seek immediate medical care if: 
· You have muscle pain or weakness. Watch closely for changes in your health, and be sure to contact your doctor if: 
· You are very tired. · You have an upset stomach, gas, constipation, or belly pain or cramps. Where can you learn more? Go to AndroJek.be Enter C406 in the search box to learn more about \"Hyperlipidemia: After Your Visit. \"  
© 7353-5954 Healthwise, Incorporated. Care instructions adapted under license by [employer] (which disclaims liability or warranty for this information). This care instruction is for use with your licensed healthcare professional. If you have questions about a medical condition or this instruction, always ask your healthcare professional. [place] [address] any warranty or liability for your use of this information. Content Version: 3.0.624193; Last Revised: October 13, 2011 Introducing [facility]! Dear May Solomon: 
Thank you for requesting a [employer] account.   Our records indicate that you already have an active [employer] account. You can access your account anytime at [email or web address] Did you know that you can access your hospital and ER discharge instructions at any time in [employer]? You can also review all of your test results from your hospital stay or ER visit. Additional Information If you have questions, please visit the Frequently Asked Questions section of the [employer] website at [email or web address] Remember, [employer] is NOT to be used for urgent needs. For medical emergencies, dial 911. Now available from your iPhone and Android! Please provide this summary of care documentation to your next provider. Your primary care clinician is listed as [address] If you have any questions after today's visit, please call [phone].

## 2017-06-20 ENCOUNTER — HOSPITAL ENCOUNTER (OUTPATIENT)
Dept: CT IMAGING | Age: 77
Discharge: HOME | End: 2017-06-20
Attending: HOSPITALIST
Payer: MEDICARE

## 2017-06-20 DIAGNOSIS — R91.8 PULMONARY NODULES: ICD-10-CM

## 2017-06-20 PROCEDURE — 71260 CT THORAX DX C+: CPT

## 2017-06-20 PROCEDURE — 74011636320 HC RX REV CODE- 636/320: Performed by: HOSPITALIST

## 2017-06-20 RX ADMIN — IOPAMIDOL 80 ML: 612 INJECTION, SOLUTION INTRAVENOUS at 08:04

## 2017-07-12 NOTE — PROGRESS NOTES
Please call and notify pt that her recent CT chest revealed stable 5 mm left lower lobe nodule and she should follow up as scheduled with her [facility] pulmonologist.

## 2017-07-13 DIAGNOSIS — J45.909 RAD (REACTIVE AIRWAY DISEASE), UNSPECIFIED ASTHMA SEVERITY, UNCOMPLICATED: ICD-10-CM

## 2017-07-13 RX ORDER — TIOTROPIUM BROMIDE 18 UG/1
CAPSULE ORAL; RESPIRATORY (INHALATION)
Qty: 30 CAP | Refills: 0 | Status: SHIPPED | OUTPATIENT
Start: 2017-07-13 | End: 2017-08-13 | Stop reason: SDUPTHER

## 2017-07-13 NOTE — PROGRESS NOTES
I called in regards to CT results. Informed Pt that CT revealed a stable 5mm left lower lobe nodule. Pt sts she received the report offline and went to See Dr Sukhjinder Mendez at [facility] in regards to this. Pt sts Dr Sukhjinder Mendez will be senting the report for review.

## 2017-07-13 NOTE — TELEPHONE ENCOUNTER
I call Pt in regards to Rx refill on Spriva. Informed Pt that Rx was refilled and sent to the pharmacy. Pt verbalized understanding.

## 2017-08-13 DIAGNOSIS — J45.909 RAD (REACTIVE AIRWAY DISEASE), UNSPECIFIED ASTHMA SEVERITY, UNCOMPLICATED: ICD-10-CM

## 2017-08-15 RX ORDER — TIOTROPIUM BROMIDE 18 UG/1
CAPSULE ORAL; RESPIRATORY (INHALATION)
Qty: 30 CAP | Refills: 3 | Status: SHIPPED | OUTPATIENT
Start: 2017-08-15 | End: 2017-12-20 | Stop reason: SDUPTHER

## 2017-08-18 DIAGNOSIS — J44.9 CHRONIC OBSTRUCTIVE PULMONARY DISEASE, UNSPECIFIED COPD TYPE (HCC): ICD-10-CM

## 2017-08-18 RX ORDER — BUDESONIDE AND FORMOTEROL FUMARATE DIHYDRATE 160; 4.5 UG/1; UG/1
2 AEROSOL RESPIRATORY (INHALATION) 2 TIMES DAILY
Qty: 1 INHALER | Refills: 5 | Status: SHIPPED | OUTPATIENT
Start: 2017-08-18 | End: 2019-08-08

## 2017-08-18 NOTE — TELEPHONE ENCOUNTER
From: Diandra Bhatt  To: Atul Hall DO  Sent: 8/18/2017 9:24 AM EDT  Subject: Medication Renewal Request    Original authorizing provider: Atul Hall DO    [address] [place] [facility] would like a refill of the following medications:  SYMBICORT 160-4.5 mcg/actuation HFA inhaler [Thompson Esteban DO]    Preferred pharmacy: [address] [place] [address] [address] AT [address] [place] [address] [address]    Comment:

## 2017-08-22 NOTE — TELEPHONE ENCOUNTER
I call Pt in regards to Rx refill on Symbicort. LM on VM to Informed Pt that Rx was refilled and sent to the pharmacy.

## 2017-09-05 RX ORDER — AMLODIPINE BESYLATE 5 MG/1
TABLET ORAL
Qty: 90 TAB | Refills: 3 | Status: SHIPPED | OUTPATIENT
Start: 2017-09-05 | End: 2018-09-07 | Stop reason: SDUPTHER

## 2017-12-04 ENCOUNTER — HOSPITAL ENCOUNTER (OUTPATIENT)
Dept: LAB | Age: 77
Discharge: HOME | End: 2017-12-04

## 2017-12-04 PROCEDURE — 99001 SPECIMEN HANDLING PT-LAB: CPT | Performed by: HOSPITALIST

## 2017-12-05 LAB
25(OH)D3+25(OH)D2 SERPL-MCNC: 53.1 NG/ML (ref 30–100)
ALBUMIN SERPL-MCNC: 4.5 G/DL (ref 3.5–4.8)
ALBUMIN/GLOB SERPL: 2 {RATIO} (ref 1.2–2.2)
ALP SERPL-CCNC: 66 IU/L (ref 39–117)
ALT SERPL-CCNC: 24 IU/L (ref 0–32)
AST SERPL-CCNC: 27 IU/L (ref 0–40)
BASOPHILS # BLD AUTO: 0.1 X10E3/UL (ref 0–0.2)
BASOPHILS NFR BLD AUTO: 1 %
BILIRUB SERPL-MCNC: 0.4 MG/DL (ref 0–1.2)
BUN SERPL-MCNC: 16 MG/DL (ref 8–27)
BUN/CREAT SERPL: 18 (ref 12–28)
CALCIUM SERPL-MCNC: 9.2 MG/DL (ref 8.7–10.3)
CHLORIDE SERPL-SCNC: 100 MMOL/L (ref 96–106)
CHOLEST SERPL-MCNC: 182 MG/DL (ref 100–199)
CO2 SERPL-SCNC: 25 MMOL/L (ref 18–29)
CREAT SERPL-MCNC: 0.91 MG/DL (ref 0.57–1)
EOSINOPHIL # BLD AUTO: 0.2 X10E3/UL (ref 0–0.4)
EOSINOPHIL NFR BLD AUTO: 4 %
ERYTHROCYTE [DISTWIDTH] IN BLOOD BY AUTOMATED COUNT: 12.8 % (ref 12.3–15.4)
GFR SERPLBLD CREATININE-BSD FMLA CKD-EPI: 60 ML/MIN/1.73
GFR SERPLBLD CREATININE-BSD FMLA CKD-EPI: 69 ML/MIN/1.73
GLOBULIN SER CALC-MCNC: 2.2 G/DL (ref 1.5–4.5)
GLUCOSE SERPL-MCNC: 79 MG/DL (ref 65–99)
HBA1C MFR BLD: 5.4 % (ref 4.8–5.6)
HCT VFR BLD AUTO: 42.6 % (ref 34–46.6)
HDLC SERPL-MCNC: 86 MG/DL
HGB BLD-MCNC: 13.7 G/DL (ref 11.1–15.9)
IMM GRANULOCYTES # BLD: 0 X10E3/UL (ref 0–0.1)
IMM GRANULOCYTES NFR BLD: 0 %
LDLC SERPL CALC-MCNC: 78 MG/DL (ref 0–99)
LYMPHOCYTES # BLD AUTO: 1.4 X10E3/UL (ref 0.7–3.1)
LYMPHOCYTES NFR BLD AUTO: 28 %
MCH RBC QN AUTO: 30.7 PG (ref 26.6–33)
MCHC RBC AUTO-ENTMCNC: 32.2 G/DL (ref 31.5–35.7)
MCV RBC AUTO: 96 FL (ref 79–97)
MONOCYTES # BLD AUTO: 0.5 X10E3/UL (ref 0.1–0.9)
MONOCYTES NFR BLD AUTO: 9 %
NEUTROPHILS # BLD AUTO: 3 X10E3/UL (ref 1.4–7)
NEUTROPHILS NFR BLD AUTO: 58 %
PLATELET # BLD AUTO: 270 X10E3/UL (ref 150–379)
POTASSIUM SERPL-SCNC: 3.9 MMOL/L (ref 3.5–5.2)
PROT SERPL-MCNC: 6.7 G/DL (ref 6–8.5)
RBC # BLD AUTO: 4.46 X10E6/UL (ref 3.77–5.28)
SODIUM SERPL-SCNC: 143 MMOL/L (ref 134–144)
T4 FREE SERPL-MCNC: 1.17 NG/DL (ref 0.82–1.77)
TRIGL SERPL-MCNC: 89 MG/DL (ref 0–149)
TSH SERPL DL<=0.005 MIU/L-ACNC: 4.03 UIU/ML (ref 0.45–4.5)
VLDLC SERPL CALC-MCNC: 18 MG/DL (ref 5–40)
WBC # BLD AUTO: 5.1 X10E3/UL (ref 3.4–10.8)

## 2017-12-20 ENCOUNTER — OFFICE VISIT (OUTPATIENT)
Dept: FAMILY MEDICINE CLINIC | Age: 77
End: 2017-12-20

## 2017-12-20 VITALS
SYSTOLIC BLOOD PRESSURE: 139 MMHG | HEART RATE: 73 BPM | RESPIRATION RATE: 16 BRPM | TEMPERATURE: 98.4 F | BODY MASS INDEX: 23.72 KG/M2 | WEIGHT: 142.4 LBS | DIASTOLIC BLOOD PRESSURE: 76 MMHG | OXYGEN SATURATION: 98 % | HEIGHT: 65 IN

## 2017-12-20 DIAGNOSIS — E78.00 PURE HYPERCHOLESTEROLEMIA: ICD-10-CM

## 2017-12-20 DIAGNOSIS — Z13.39 SCREENING FOR ALCOHOLISM: ICD-10-CM

## 2017-12-20 DIAGNOSIS — R73.03 PREDIABETES: ICD-10-CM

## 2017-12-20 DIAGNOSIS — J45.909 RAD (REACTIVE AIRWAY DISEASE), UNSPECIFIED ASTHMA SEVERITY, UNCOMPLICATED: ICD-10-CM

## 2017-12-20 DIAGNOSIS — Z71.89 ADVANCE CARE PLANNING: ICD-10-CM

## 2017-12-20 DIAGNOSIS — Z79.899 ENCOUNTER FOR LONG-TERM (CURRENT) USE OF MEDICATIONS: ICD-10-CM

## 2017-12-20 DIAGNOSIS — I10 ESSENTIAL HYPERTENSION: Primary | ICD-10-CM

## 2017-12-20 DIAGNOSIS — E55.9 HYPOVITAMINOSIS D: ICD-10-CM

## 2017-12-20 DIAGNOSIS — Z13.31 SCREENING FOR DEPRESSION: ICD-10-CM

## 2017-12-20 DIAGNOSIS — Z00.00 MEDICARE ANNUAL WELLNESS VISIT, SUBSEQUENT: ICD-10-CM

## 2017-12-20 NOTE — PROGRESS NOTES
Chief Complaint   Patient presents with    Results    Annual Wellness Visit       HPI:   Patient is a 76year old  female with medical problems listed below presents today for follow up of Hypertension, Hyperlipidemia, Prediabetes, Hypovitaminosis D, etc. She has been feeling well and voices no complaints today. She is complaint with her medications with no adverse effects reported. Her COPD has improved as she continues to take Spiriva and Symbicort together and is followed by pulmonologist Dr. Lyle Plata with [facility] and she is requesting refill of Spiriva today. She continues to maintain an active lifestyle via swimming and walking daily. Past Medical History:   Diagnosis Date    Allergic rhinitis     AR (allergic rhinitis) 11/16/2011    Cataract     Chronic sinusitis     Hyperlipidaemia     Hyperlipidemia 11/16/2011    Hypertension     Mild Diastolic    Low ferritin level     Osteopenia 11/16/2011    Osteoporosis     Pneumonia     Vitamin D deficiencies     Mild     Allergies   Allergen Reactions    Augmentin [Amoxicillin-Pot Clavulanate] Diarrhea     severe    Sulfamerazine Diarrhea     Current Outpatient Prescriptions   Medication Sig Dispense Refill    FLUAD 0220-4676, 65 YR UP,,PF, syrg injection ADM 0.5ML IM UTD  0    amLODIPine (NORVASC) 5 mg tablet TAKE 1 TABLET DAILY FOR HYPERTENSION 90 Tab 3    budesonide-formoterol (SYMBICORT) 160-4.5 mcg/actuation HFA inhaler Take 2 Puffs by inhalation two (2) times a day. 1 Inhaler 5    SPIRIVA WITH HANDIHALER 18 mcg inhalation capsule INHALE THE CONTENTS OF 1 CAPSULE VIA INHALATION DEVICE DAILY 30 Cap 3    albuterol (PROVENTIL HFA, VENTOLIN HFA, PROAIR HFA) 90 mcg/actuation inhaler Take 1 Puff by inhalation every four (4) hours as needed for Wheezing. 1 Inhaler 3    albuterol (PROVENTIL) 2 mg tablet Take 1 Tab by mouth every eight (8) hours as needed. 60 Tab 3    simvastatin (ZOCOR) 20 mg tablet Take 1 Tab by mouth nightly.  90 Tab 3  fluticasone (FLONASE) 50 mcg/actuation nasal spray 2 Sprays by Both Nostrils route daily. Indications: ALLERGIC RHINITIS 3 Bottle 3    MULTIVIT-MIN/FA/LUTEIN/ZEAXANT (ICAPS MV PO) Take 1 Tab by mouth four (4) times daily.  glucosamine 1,000 mg Tab Take 1,000 mg by mouth daily.  Cholecalciferol, Vitamin D3, (VITAMIN D) 2,000 unit Cap Take  by mouth daily.  calcium-vitamin D (CALCIUM 500+D) 500 mg(1,250mg) -200 unit per tablet Take 1 Tab by mouth two (2) times daily (with meals). With magnesium       fish oil-dha-epa (FISH OIL) 1,200-144-216 mg Cap Take 2 Caps by mouth daily.  STRONTIUM GLUCONATE-B6-B12-FA PO Take 1 Tab by mouth two (2) times a day. ROS:  Constitutional: Negative for fever, chills, or fatigue  Neurological: Negative for headache, dizziness, visual disturbance, or loss of conciousness  Respiratory: Negative for SOB or cough.    Cardiovascular: Negative for chest pain, palpitation, or leg swelling  Gastrointestinal: Negative for abdominal pain, nausea, vomiting, diarrhea, blood in stool, melena, or heartburn  Musculoskeletal: Negative for falls      Physical Exam:  Visit Vitals    /76 (BP 1 Location: Left arm, BP Patient Position: Sitting)  Comment (BP 1 Location): walked upstairs    Pulse 73    Temp 98.4 °F (36.9 °C) (Oral)    Resp 16    Ht 5' 5\" (1.651 m)    Wt 142 lb 6.4 oz (64.6 kg)    LMP  (Approximate)    SpO2 98%    BMI 23.7 kg/m2     Constitutional: a & o x 3, in no acute distress, well nourished, and interacting appropriately  Respiratory: symmetrical chest expansion, lungs clear to auscultation bilaterally, good air entry, normal respiratory effort, no wheezes or crackles  CV:normal S1S2, regular rate and rhythm, no murmurs, no carotid bruits, no JVD, pulses palpable, no thrills  ABD: Soft, non tender, non distended, no organomegaly, positive bowel sounds  EXT: No edema noted on bilateral lower extremity      Assessment/Plan:    ICD-10-CM ICD-9-CM    1. Essential hypertension I10 401.9 Controlled  Continue current meds and she was counseled on low salt diet. CBC WITH AUTOMATED DIFF      METABOLIC PANEL, COMPREHENSIVE      T4, FREE      TSH 3RD GENERATION   2. RAD (reactive airway disease), unspecified asthma severity, uncomplicated J85.553 675.76 tiotropium (SPIRIVA WITH HANDIHALER) 18 mcg inhalation capsule   3. Prediabetes R73.03 790.29 Recent HBA1C improved at 5.4 - Counseled on diet and exercise. HEMOGLOBIN A1C W/O EAG   4. Pure hypercholesterolemia E78.00 272.0 Recent lipid panel done 12/4/17 reviewed with pt and revealed Cho 182 and LDL 78. Continue Simvastatin 20 mg daily and she was counseled on low fat diet. LIPID PANEL   5. Hypovitaminosis D E55.9 268.9 Recent Vit D is good. VITAMIN D, 25 HYDROXY   6. Encounter for long-term (current) use of medications Z79.899 V58.69 HEMOGLOBIN A1C W/O EAG   7. Medicare annual wellness visit, subsequent Z00.00 V70.0 Medicare Annual Wellness Visit was completed at the office today. 8. Screening for alcoholism Z13.89 V79.1    9. Advance care planning Z71.89 V65.49    10.  Screening for depression Z13.89 V79.0 Advance directive was discussed with pt today and she endorsed having a living will       Orders Placed This Encounter    CBC WITH AUTOMATED DIFF     Standing Status:   Future     Standing Expiration Date:   7/18/2018    HEMOGLOBIN A1C W/O EAG     Standing Status:   Future     Standing Expiration Date:   12/21/2018    LIPID PANEL     Standing Status:   Future     Standing Expiration Date:   6/45/3548    METABOLIC PANEL, COMPREHENSIVE     Standing Status:   Future     Standing Expiration Date:   7/18/2018    T4, FREE     Standing Status:   Future     Standing Expiration Date:   7/18/2018    TSH 3RD GENERATION     Standing Status:   Future     Standing Expiration Date:   7/18/2018    VITAMIN D, 25 HYDROXY     Standing Status:   Future     Standing Expiration Date:   7/18/2018    FLUAD [id number], 65 YR UP,,PF, syrg injection     Sig: ADM 0.5ML IM UTD     Refill:  0    tiotropium (SPIRIVA WITH HANDIHALER) 18 mcg inhalation capsule     Sig: Take 1 Cap by inhalation daily. Dispense:  90 Cap     Refill:  2         Recent labs reviewed with pt       Additional Notes: Discussed today's diagnosis and treatment plans. Medication indications and adverse effects discussed. After Visit Summary: Provided and discussed printed patient instructions. Questions in relation to diagnosis answered. Follow-up disposition: In 6 months with labs 1 week prior        Abdiaziz South DO, MPH  Internal Medicine        This is a Subsequent Medicare Annual Wellness Exam (AWV) (Performed 12 months after IPPE or effective date of Medicare Part B enrollment)    I have reviewed the patient's medical history in detail and updated the computerized patient record. History     Past Medical History:   Diagnosis Date    Allergic rhinitis     AR (allergic rhinitis) 11/16/2011    Cataract     Chronic sinusitis     Hyperlipidaemia     Hyperlipidemia 11/16/2011    Hypertension     Mild Diastolic    Low ferritin level     Osteopenia 11/16/2011    Osteoporosis     Pneumonia     Vitamin D deficiencies     Mild      Past Surgical History:   Procedure Laterality Date    ABDOMEN SURGERY PROC UNLISTED  1964    Hysterectomy    ENDOSCOPY, COLON, DIAGNOSTIC  12/4/01    PT DECLINED     Current Outpatient Prescriptions   Medication Sig Dispense Refill    FLUAD [id number], 65 YR UP,,PF, syrg injection ADM 0.5ML IM UTD  0    amLODIPine (NORVASC) 5 mg tablet TAKE 1 TABLET DAILY FOR HYPERTENSION 90 Tab 3    budesonide-formoterol (SYMBICORT) 160-4.5 mcg/actuation HFA inhaler Take 2 Puffs by inhalation two (2) times a day.  1 Inhaler 5    SPIRIVA WITH HANDIHALER 18 mcg inhalation capsule INHALE THE CONTENTS OF 1 CAPSULE VIA INHALATION DEVICE DAILY 30 Cap 3    albuterol (PROVENTIL HFA, VENTOLIN HFA, PROAIR HFA) 90 mcg/actuation inhaler Take 1 Puff by inhalation every four (4) hours as needed for Wheezing. 1 Inhaler 3    albuterol (PROVENTIL) 2 mg tablet Take 1 Tab by mouth every eight (8) hours as needed. 60 Tab 3    simvastatin (ZOCOR) 20 mg tablet Take 1 Tab by mouth nightly. 90 Tab 3    fluticasone (FLONASE) 50 mcg/actuation nasal spray 2 Sprays by Both Nostrils route daily. Indications: ALLERGIC RHINITIS 3 Bottle 3    MULTIVIT-MIN/FA/LUTEIN/ZEAXANT (ICAPS MV PO) Take 1 Tab by mouth four (4) times daily.  glucosamine 1,000 mg Tab Take 1,000 mg by mouth daily.  Cholecalciferol, Vitamin D3, (VITAMIN D) 2,000 unit Cap Take  by mouth daily.  calcium-vitamin D (CALCIUM 500+D) 500 mg(1,250mg) -200 unit per tablet Take 1 Tab by mouth two (2) times daily (with meals). With magnesium       fish oil-dha-epa (FISH OIL) 1,200-144-216 mg Cap Take 2 Caps by mouth daily.  STRONTIUM GLUCONATE-B6-B12-FA PO Take 1 Tab by mouth two (2) times a day.        Allergies   Allergen Reactions    Augmentin [Amoxicillin-Pot Clavulanate] Diarrhea     severe    Sulfamerazine Diarrhea     Family History   Problem Relation Age of Onset    Hypertension Mother     Other Mother      [facility]   [facility] Cancer Mother      Possibly Breast    COPD Father     Cancer Father      Some Form     Social History   Substance Use Topics    Smoking status: Never Smoker    Smokeless tobacco: Not on file    Alcohol use No     Patient Active Problem List   Diagnosis Code    HTN (hypertension) I10    Osteopenia M85.80    AR (allergic rhinitis) J30.9    Hyperlipidemia E78.5    RAD (reactive airway disease) J45.909    Low serum ferritin level R79.0    Chest pressure R07.89    Fatigue R53.83    Cough R05    DUBON (dyspnea on exertion) R06.09    COPD (chronic obstructive pulmonary disease) ([facility] [address] J44.9    Advance care planning Z71.89    Need for pneumococcal vaccination Z23    Pulmonary nodules R91.8    Encounter for long-term (current) use of medications Z79.899    Medicare annual wellness visit, subsequent Z00.00    Screening for depression Z13.89    Hypovitaminosis D E55.9    Prediabetes R73.03       Depression Risk Factor Screening:     PHQ over the last two weeks 12/20/2017   Little interest or pleasure in doing things -   Feeling down, depressed or hopeless Not at all   Total Score PHQ 2 -     Alcohol Risk Factor Screening:   Drinks wine daily with dinner    Functional Ability and Level of Safety:   Hearing Loss  Hearing is good. Activities of Daily Living  The home contains: no safety equipment. Patient does total self care    Fall Risk  Fall Risk Assessment, last 12 mths 12/20/2017   Able to walk? Yes   Fall in past 12 months? No       Abuse Screen  Patient is not abused    Cognitive Screening   Evaluation of Cognitive Function:  Has your family/caregiver stated any concerns about your memory: no  Normal    Patient Care Team   Patient Care Team:  Alexander Montesinos, DO as PCP - General (Internal Medicine)  Diana Goyal MD (Pulmonary Disease)    Assessment/Plan   Education and counseling provided:  Are appropriate based on today's review and evaluation  End-of-Life planning (with patient's consent) - Advance directive was discussed with pt today and she endorsed having a living will  Pneumococcal Vaccine - given 12/7/15  Influenza Vaccine - Flu shot given 11/23/17  Screening Mammography  Colorectal cancer screening tests  Cardiovascular screening blood test  Bone mass measurement (DEXA) - DEXA done 6/16/15  Screening for glaucoma - done 5/9/16  Diabetes screening test - HBA1C checked 12/5/16 was 5.7  Medical nutrition therapy for individuals with diabetes or renal disease  Diabetes outpatient self-management training services  Zoster vaccine given 11/11/08      Diagnoses and all orders for this visit:    1. Prediabetes    2.  RAD (reactive airway disease), unspecified asthma severity, uncomplicated  -     tiotropium (SPIRIVA WITH HANDIHALER) 18 mcg inhalation capsule;     3. Essential hypertension    4. Pure hypercholesterolemia    5. Hypovitaminosis D    6. Medicare annual wellness visit, subsequent    7. Screening for alcoholism    8. Advance care planning    9.  Screening for depression        Health Maintenance Due   Topic Date Due    MEDICARE YEARLY EXAM  12/13/2017           Juan Luis Mcgowan DO, MPH  Internal Medicine

## 2017-12-20 NOTE — MR AVS SNAPSHOT
Visit Information Date & Time Provider Department Dept. Phone Encounter #  
 12/20/2017  7:45 AM Lucretia Spurling, [address] [phone] [id number] Follow-up Instructions Return in about 6 months (around 6/20/2018) for Labs 1 week before. Upcoming Health Maintenance Date Due  
 MEDICARE YEARLY EXAM 12/13/2017 DTaP/Tdap/Td series (1 - Tdap) 5/24/2018* GLAUCOMA SCREENING Q2Y 5/9/2018 *Topic was postponed. The date shown is not the original due date. Allergies as of 12/20/2017  Review Complete On: 12/20/2017 By: Jian Stewart LPN Severity Noted Reaction Type Reactions Augmentin [Amoxicillin-pot Clavulanate]  11/15/2012    Diarrhea  
 severe Sulfamerazine  12/20/2017    Diarrhea Current Immunizations  Reviewed on 12/20/2017 Name Date Influenza High Dose Vaccine PF 10/9/2015 Influenza Vaccine 11/23/2017, 10/5/2014, 9/22/2013 Influenza Vaccine Whole 9/28/2012 Pneumococcal Conjugate (PCV-13) 12/7/2015 ZZZ-RETIRED (DO NOT USE) Pneumococcal Vaccine (Unspecified Type) 11/11/2008 Zoster Vaccine, Live 11/11/2008 Reviewed by Lucretia Spurling, DO on 12/20/2017 at  8:18 AM  
You Were Diagnosed With   
  
 Codes Comments Prediabetes    -  Primary ICD-10-CM: R73.03 
ICD-9-CM: 790.29 RAD (reactive airway disease), unspecified asthma severity, uncomplicated     WPX-17-HK: J45.909 ICD-9-CM: 493.90 Essential hypertension     ICD-10-CM: I10 
ICD-9-CM: 401.9 Pure hypercholesterolemia     ICD-10-CM: E78.00 ICD-9-CM: 272.0 Hypovitaminosis D     ICD-10-CM: E55.9 ICD-9-CM: 268.9 Medicare annual wellness visit, subsequent     ICD-10-CM: Z00.00 ICD-9-CM: V70.0 Screening for alcoholism     ICD-10-CM: Z13.89 ICD-9-CM: V79.1 Advance care planning     ICD-10-CM: Z71.89 ICD-9-CM: V65.49 Screening for depression     ICD-10-CM: Z13.89 ICD-9-CM: V79.0 Encounter for long-term (current) use of medications     ICD-10-CM: Z79.899 ICD-9-CM: V58.69 Vitals BP Pulse Temp Resp Height(growth percentile) Weight(growth percentile) 139/76 (BP 1 Location: Left arm, BP Patient Position: Sitting) 73 98.4 °F (36.9 °C) (Oral) 16 5' 5\" (1.651 m) 142 lb 6.4 oz (64.6 kg) LMP SpO2 BMI OB Status Smoking Status (Approximate) 98% 23.7 kg/m2 Hysterectomy Never Smoker Vitals History BMI and BSA Data Body Mass Index Body Surface Area  
 23.7 kg/m 2 1.72 m 2 Preferred Pharmacy Pharmacy Name Phone [phone] - [address], [address] AT [address] [phone] Your Updated Medication List  
  
   
This list is accurate as of: 12/20/17  8:24 AM.  Always use your most recent med list.  
  
  
  
  
 * albuterol 90 mcg/actuation inhaler Commonly known as:  PROVENTIL HFA, VENTOLIN HFA, PROAIR HFA Take 1 Puff by inhalation every four (4) hours as needed for Wheezing. * albuterol 2 mg tablet Commonly known as:  PROVENTIL Take 1 Tab by mouth every eight (8) hours as needed. amLODIPine 5 mg tablet Commonly known as:  Alba Spruce TAKE 1 TABLET DAILY FOR HYPERTENSION  
  
 budesonide-formoterol 160-4.5 mcg/actuation Hfaa Commonly known as:  SYMBICORT Take 2 Puffs by inhalation two (2) times a day. CALCIUM 500+D 500 mg(1,250mg) -200 unit per tablet Generic drug:  calcium-vitamin D Take 1 Tab by mouth two (2) times daily (with meals). With magnesium FISH OIL 1,200-144-216 mg Cap Generic drug:  fish oil-dha-epa Take 2 Caps by mouth daily. FLUAD 2170-1931 (65 YR UP)(PF) Syrg injection Generic drug:  influenza vaccine 2017-18 (65 yrs+)(PF)  
ADM 0.5ML IM UTD  
  
 fluticasone 50 mcg/actuation nasal spray Commonly known as:  Ronnie Redo 2 Sprays by Both Nostrils route daily. Indications: ALLERGIC RHINITIS  
  
 glucosamine 1,000 mg Tab Take 1,000 mg by mouth daily. ICAPS MV PO Take 1 Tab by mouth four (4) times daily. simvastatin 20 mg tablet Commonly known as:  ZOCOR Take 1 Tab by mouth nightly. STRONTIUM GLUCONATE-B6-B12-FA PO Take 1 Tab by mouth two (2) times a day. tiotropium 18 mcg inhalation capsule Commonly known as:  [address] [place] [address] Drive Take 1 Cap by inhalation daily. VITAMIN D 2,000 unit Cap capsule Generic drug:  Cholecalciferol (Vitamin D3) Take  by mouth daily. * Notice: This list has 2 medication(s) that are the same as other medications prescribed for you. Read the directions carefully, and ask your doctor or other care provider to review them with you. Prescriptions Sent to Pharmacy Refills  
 tiotropium (SPIRIVA WITH HANDIHALER) 18 mcg inhalation capsule 2 Sig: Take 1 Cap by inhalation daily. Class: Normal  
 Pharmacy: [facility] [address] [place] [address] AT [address] Ph #: [phone] Route: Inhalation Follow-up Instructions Return in about 6 months (around 6/20/2018) for Labs 1 week before. To-Do List   
 06/18/2018 Lab:  CBC WITH AUTOMATED DIFF   
  
 06/18/2018 Lab:  HEMOGLOBIN A1C W/O EAG   
  
 06/18/2018 Lab:  LIPID PANEL   
  
 06/18/2018 Lab:  METABOLIC PANEL, COMPREHENSIVE   
  
 06/18/2018 Lab:  T4, FREE   
  
 06/18/2018 Lab:  TSH 3RD GENERATION   
  
 06/18/2018 Lab:  VITAMIN D, 25 HYDROXY Patient Instructions DASH Diet: Care Instructions Your Care Instructions The DASH diet is an eating plan that can help lower your blood pressure. DASH stands for Dietary Approaches to Stop Hypertension. Hypertension is high blood pressure. The DASH diet focuses on eating foods that are high in calcium, potassium, and magnesium. These nutrients can lower blood pressure.  The foods that are highest in these nutrients are fruits, vegetables, low-fat dairy products, nuts, seeds, and legumes. But taking calcium, potassium, and magnesium supplements instead of eating foods that are high in those nutrients does not have the same effect. The DASH diet also includes whole grains, fish, and poultry. The DASH diet is one of several lifestyle changes your doctor may recommend to lower your high blood pressure. Your doctor may also want you to decrease the amount of sodium in your diet. Lowering sodium while following the DASH diet can lower blood pressure even further than just the DASH diet alone. Follow-up care is a key part of your treatment and safety. Be sure to make and go to all appointments, and call your doctor if you are having problems. It's also a good idea to know your test results and keep a list of the medicines you take. How can you care for yourself at home? Following the DASH diet · Eat 4 to 5 servings of fruit each day. A serving is 1 medium-sized piece of fruit, ½ cup chopped or canned fruit, 1/4 cup dried fruit, or 4 ounces (½ cup) of fruit juice. Choose fruit more often than fruit juice. · Eat 4 to 5 servings of vegetables each day. A serving is 1 cup of lettuce or raw leafy vegetables, ½ cup of chopped or cooked vegetables, or 4 ounces (½ cup) of vegetable juice. Choose vegetables more often than vegetable juice. · Get 2 to 3 servings of low-fat and fat-free dairy each day. A serving is 8 ounces of milk, 1 cup of yogurt, or 1 ½ ounces of cheese. · Eat 6 to 8 servings of grains each day. A serving is 1 slice of bread, 1 ounce of dry cereal, or ½ cup of cooked rice, pasta, or cooked cereal. Try to choose whole-grain products as much as possible. · Limit lean meat, poultry, and fish to 2 servings each day. A serving is 3 ounces, about the size of a deck of cards.  
· Eat 4 to 5 servings of nuts, seeds, and legumes (cooked dried beans, lentils, and split peas) each week. A serving is 1/3 cup of nuts, 2 tablespoons of seeds, or ½ cup of cooked beans or peas. · Limit fats and oils to 2 to 3 servings each day. A serving is 1 teaspoon of vegetable oil or 2 tablespoons of salad dressing. · Limit sweets and added sugars to 5 servings or less a week. A serving is 1 tablespoon jelly or jam, ½ cup sorbet, or 1 cup of lemonade. · Eat less than 2,300 milligrams (mg) of sodium a day. If you limit your sodium to 1,500 mg a day, you can lower your blood pressure even more. Tips for success · Start small. Do not try to make dramatic changes to your diet all at once. You might feel that you are missing out on your favorite foods and then be more likely to not follow the plan. Make small changes, and stick with them. Once those changes become habit, add a few more changes. · Try some of the following: ¨ Make it a goal to eat a fruit or vegetable at every meal and at snacks. This will make it easy to get the recommended amount of fruits and vegetables each day. ¨ Try yogurt topped with fruit and nuts for a snack or healthy dessert. ¨ Add lettuce, tomato, cucumber, and onion to sandwiches. ¨ Combine a ready-made pizza crust with low-fat mozzarella cheese and lots of vegetable toppings. Try using tomatoes, squash, spinach, broccoli, carrots, cauliflower, and onions. ¨ Have a variety of cut-up vegetables with a low-fat dip as an appetizer instead of chips and dip. ¨ Sprinkle sunflower seeds or chopped almonds over salads. Or try adding chopped walnuts or almonds to cooked vegetables. ¨ Try some vegetarian meals using beans and peas. Add garbanzo or kidney beans to salads. Make burritos and tacos with mashed francis beans or black beans. Where can you learn more? Go to http://adonay-isaiah.info/. Enter D626 in the search box to learn more about \"DASH Diet: Care Instructions. \" Current as of: September 21, 2016 Content Version: 11.4 © 0870-9169 Healthwise, [employer]. Care instructions adapted under license by [employer] (which disclaims liability or warranty for this information). If you have questions about a medical condition or this instruction, always ask your healthcare professional. [address] any warranty or liability for your use of this information. Prediabetes: Care Instructions Your Care Instructions Prediabetes is a warning sign that you are at risk for getting type 2 diabetes. It means that your blood sugar is higher than it should be. The food you eat turns into sugar, which your body uses for energy. Normally, an organ called the pancreas makes insulin, which allows the sugar in your blood to get into your body's cells. But when your body can't use insulin the right way, the sugar doesn't move into cells. It stays in your blood instead. This is called insulin resistance. The buildup of sugar in the blood causes prediabetes. The good news is that lifestyle changes may help you get your blood sugar back to normal and help you avoid or delay diabetes. Follow-up care is a key part of your treatment and safety. Be sure to make and go to all appointments, and call your doctor if you are having problems. It's also a good idea to know your test results and keep a list of the medicines you take. How can you care for yourself at home? · Watch your weight. A healthy weight helps your body use insulin properly. · Limit the amount of calories, sweets, and unhealthy fat you eat. Ask your doctor if you should see a dietitian. A registered dietitian can help you create meal plans that fit your lifestyle. · Get at least 30 minutes of exercise on most days of the week. Exercise helps control your blood sugar. It also helps you maintain a healthy weight. Walking is a good choice. You also may want to do other activities, such as running, swimming, cycling, or playing tennis or team sports. · Do not smoke. Smoking can make prediabetes worse. If you need help quitting, talk to your doctor about stop-smoking programs and medicines. These can increase your chances of quitting for good. · If your doctor prescribed medicines, take them exactly as prescribed. Call your doctor if you think you are having a problem with your medicine. You will get more details on the specific medicines your doctor prescribes. When should you call for help? Watch closely for changes in your health, and be sure to contact your doctor if: 
? · You have any symptoms of diabetes. These may include: ¨ Being thirsty more often. ¨ Urinating more. ¨ Being hungrier. ¨ Losing weight. ¨ Being very tired. ¨ Having blurry vision. ? · You have a wound that will not heal.  
? · You have an infection that will not go away. ? · You have problems with your blood pressure. ? · You want more information about diabetes and how you can keep from getting it. Where can you learn more? Go to http://adonay-isaiah.info/. Enter I222 in the search box to learn more about \"Prediabetes: Care Instructions. \" Current as of: March 13, 2017 Content Version: 11.4 © 2303-1219 Betyah. Care instructions adapted under license by [employer] (which disclaims liability or warranty for this information). If you have questions about a medical condition or this instruction, always ask your healthcare professional. [place] [address] any warranty or liability for your use of this information. Hyperlipidemia: After Your Visit Your Care Instructions Hyperlipidemia is too much fat in your blood. The body has several kinds of fat, including cholesterol and triglycerides. Your body needs fat for many things, such as making new cells. But too much fat in your blood increases your chances of having a heart attack or stroke.  
You may be able to lower your cholesterol and triglycerides with a heart-healthy diet, exercise, and if needed, medicine. Your doctor may want you to try lifestyle changes first to see whether they lower the fat in your blood. You may need to take medicine if lifestyle changes do not lower the fat in your blood enough. Follow-up care is a key part of your treatment and safety. Be sure to make and go to all appointments, and call your doctor if you are having problems. Its also a good idea to know your test results and keep a list of the medicines you take. How can you care for yourself at home? Take your medicines · Take your medicines exactly as prescribed. Call your doctor if you think you are having a problem with your medicine. · If you take medicine to lower your cholesterol, go to follow-up visits. You will need to have blood tests. · Do not take large doses of niacin, which is a B vitamin, while taking medicine called statins. It may increase the chance of muscle pain and liver problems. · Talk to your doctor about avoiding grapefruit juice if you are taking statins. Grapefruit juice can raise the level of this medicine in your blood. This could increase side effects. Eat more fruits, vegetables, and fiber · Fruits and vegetables have lots of nutrients that help protect against heart disease, and they have littleif anyfat. Try to eat at least five servings a day. Dark green, deep orange, or yellow fruits and vegetables are healthy choices. · Keep carrots, celery, and other veggies handy for snacks. Buy fruit that is in season and store it where you can see it so that you will be tempted to eat it. Cook dishes that have a lot of veggies in them, such as stir-fries and soups. · Foods high in fiber may reduce your cholesterol and provide important vitamins and minerals. High-fiber foods include whole-grain cereals and breads, oatmeal, beans, brown rice, citrus fruits, and apples. · Buy whole-grain breads and cereals instead of white bread and pastries. Limit saturated fat · Read food labels and try to avoid saturated fat and trans fat. They increase your risk of heart disease. · Use olive or canola oil when you cook. Try cholesterol-lowering spreads, such as Benecol or Take Control. · Bake, broil, grill, or steam foods instead of frying them. · Limit the amount of high-fat meats you eat, including hot dogs and sausages. Cut out all visible fat when you prepare meat. · Eat fish, skinless poultry, and soy products such as tofu instead of high-fat meats. Soybeans may be especially good for your heart. Eat at least two servings of fish a week. Certain fish, such as salmon, contain omega-3 fatty acids, which may help reduce your risk of heart attack. · Choose low-fat or fat-free milk and dairy products. Get exercise, limit alcohol, and quit smoking · Get more exercise. Work with your doctor to set up an exercise program. Even if you can do only a small amount, exercise will help you get stronger, have more energy, and manage your weight and your stress. Walking is an easy way to get exercise. Gradually increase the amount you walk every day. Aim for at least 30 minutes on most days of the week. You also may want to swim, bike, or do other activities. · Limit alcohol to no more than 2 drinks a day for men and 1 drink a day for women. · Do not smoke. If you need help quitting, talk to your doctor about stop-smoking programs and medicines. These can increase your chances of quitting for good. When should you call for help? Call 911 anytime you think you may need emergency care. For example, call if: 
· You have symptoms of a heart attack. These may include: ¨ Chest pain or pressure, or a strange feeling in the chest. 
¨ Sweating. ¨ Shortness of breath. ¨ Nausea or vomiting. ¨ Pain, pressure, or a strange feeling in the back, neck, jaw, or upper belly or in one or both shoulders or arms. ¨ Lightheadedness or sudden weakness. ¨ A fast or irregular heartbeat. After you call 911, the  may tell you to chew 1 adult-strength or 2 to 4 low-dose aspirin. Wait for an ambulance. Do not try to drive yourself. · You have signs of a stroke. These may include: 
¨ Sudden numbness, paralysis, or weakness in your face, arm, or leg, especially on only one side of your body. ¨ New problems with walking or balance. ¨ Sudden vision changes. ¨ Drooling or slurred speech. ¨ New problems speaking or understanding simple statements, or feeling confused. ¨ A sudden, severe headache that is different from past headaches. · You passed out (lost consciousness). Call your doctor now or seek immediate medical care if: 
· You have muscle pain or weakness. Watch closely for changes in your health, and be sure to contact your doctor if: 
· You are very tired. · You have an upset stomach, gas, constipation, or belly pain or cramps. Where can you learn more? Go to SeekPanda.be Enter C406 in the search box to learn more about \"Hyperlipidemia: After Your Visit. \"  
© 0554-6875 Healthwise, Incorporated. Care instructions adapted under license by Jim Martinez (which disclaims liability or warranty for this information). This care instruction is for use with your licensed healthcare professional. If you have questions about a medical condition or this instruction, always ask your healthcare professional. [address] any warranty or liability for your use of this information. Content Version: 9.3.123639; Last Revised: October 13, 2011 Introducing [facility]! Dear Magaly Tinoco: 
Thank you for requesting a [employer] account. Our records indicate that you already have an active [employer] account. You can access your account anytime at [email or web address] Did you know that you can access your hospital and ER discharge instructions at any time in Leads Direct? You can also review all of your test results from your hospital stay or ER visit. Additional Information If you have questions, please visit the Frequently Asked Questions section of the [employer] website at [email or web address] Remember, [employer] is NOT to be used for urgent needs. For medical emergencies, dial 911. Now available from your iPhone and Android! Please provide this summary of care documentation to your next provider. Your primary care clinician is listed as Kendall Friend. If you have any questions after today's visit, please call [phone].

## 2017-12-20 NOTE — PATIENT INSTRUCTIONS
DASH Diet: Care Instructions  Your Care Instructions    The DASH diet is an eating plan that can help lower your blood pressure. DASH stands for Dietary Approaches to Stop Hypertension. Hypertension is high blood pressure. The DASH diet focuses on eating foods that are high in calcium, potassium, and magnesium. These nutrients can lower blood pressure. The foods that are highest in these nutrients are fruits, vegetables, low-fat dairy products, nuts, seeds, and legumes. But taking calcium, potassium, and magnesium supplements instead of eating foods that are high in those nutrients does not have the same effect. The DASH diet also includes whole grains, fish, and poultry. The DASH diet is one of several lifestyle changes your doctor may recommend to lower your high blood pressure. Your doctor may also want you to decrease the amount of sodium in your diet. Lowering sodium while following the DASH diet can lower blood pressure even further than just the DASH diet alone. Follow-up care is a key part of your treatment and safety. Be sure to make and go to all appointments, and call your doctor if you are having problems. It's also a good idea to know your test results and keep a list of the medicines you take. How can you care for yourself at home? Following the DASH diet  · Eat 4 to 5 servings of fruit each day. A serving is 1 medium-sized piece of fruit, ½ cup chopped or canned fruit, 1/4 cup dried fruit, or 4 ounces (½ cup) of fruit juice. Choose fruit more often than fruit juice. · Eat 4 to 5 servings of vegetables each day. A serving is 1 cup of lettuce or raw leafy vegetables, ½ cup of chopped or cooked vegetables, or 4 ounces (½ cup) of vegetable juice. Choose vegetables more often than vegetable juice. · Get 2 to 3 servings of low-fat and fat-free dairy each day. A serving is 8 ounces of milk, 1 cup of yogurt, or 1 ½ ounces of cheese. · Eat 6 to 8 servings of grains each day.  A serving is 1 slice of bread, 1 ounce of dry cereal, or ½ cup of cooked rice, pasta, or cooked cereal. Try to choose whole-grain products as much as possible. · Limit lean meat, poultry, and fish to 2 servings each day. A serving is 3 ounces, about the size of a deck of cards. · Eat 4 to 5 servings of nuts, seeds, and legumes (cooked dried beans, lentils, and split peas) each week. A serving is 1/3 cup of nuts, 2 tablespoons of seeds, or ½ cup of cooked beans or peas. · Limit fats and oils to 2 to 3 servings each day. A serving is 1 teaspoon of vegetable oil or 2 tablespoons of salad dressing. · Limit sweets and added sugars to 5 servings or less a week. A serving is 1 tablespoon jelly or jam, ½ cup sorbet, or 1 cup of lemonade. · Eat less than 2,300 milligrams (mg) of sodium a day. If you limit your sodium to 1,500 mg a day, you can lower your blood pressure even more. Tips for success  · Start small. Do not try to make dramatic changes to your diet all at once. You might feel that you are missing out on your favorite foods and then be more likely to not follow the plan. Make small changes, and stick with them. Once those changes become habit, add a few more changes. · Try some of the following:  ¨ Make it a goal to eat a fruit or vegetable at every meal and at snacks. This will make it easy to get the recommended amount of fruits and vegetables each day. ¨ Try yogurt topped with fruit and nuts for a snack or healthy dessert. ¨ Add lettuce, tomato, cucumber, and onion to sandwiches. ¨ Combine a ready-made pizza crust with low-fat mozzarella cheese and lots of vegetable toppings. Try using tomatoes, squash, spinach, broccoli, carrots, cauliflower, and onions. ¨ Have a variety of cut-up vegetables with a low-fat dip as an appetizer instead of chips and dip. ¨ Sprinkle sunflower seeds or chopped almonds over salads. Or try adding chopped walnuts or almonds to cooked vegetables.   ¨ Try some vegetarian meals using beans and peas. Add garbanzo or kidney beans to salads. Make burritos and tacos with mashed francis beans or black beans. Where can you learn more? Go to http://adonay-isaiah.info/. Enter T050 in the search box to learn more about \"DASH Diet: Care Instructions. \"  Current as of: September 21, 2016  Content Version: 11.4  © 2415-9544 [employer]. Care instructions adapted under license by [employer]) (which disclaims liability or warranty for this information). If you have questions about a medical condition or this instruction, always ask your healthcare professional. [address] any warranty or liability for your use of this information. Prediabetes: Care Instructions  Your Care Instructions    Prediabetes is a warning sign that you are at risk for getting type 2 diabetes. It means that your blood sugar is higher than it should be. The food you eat turns into sugar, which your body uses for energy. Normally, an organ called the pancreas makes insulin, which allows the sugar in your blood to get into your body's cells. But when your body can't use insulin the right way, the sugar doesn't move into cells. It stays in your blood instead. This is called insulin resistance. The buildup of sugar in the blood causes prediabetes. The good news is that lifestyle changes may help you get your blood sugar back to normal and help you avoid or delay diabetes. Follow-up care is a key part of your treatment and safety. Be sure to make and go to all appointments, and call your doctor if you are having problems. It's also a good idea to know your test results and keep a list of the medicines you take. How can you care for yourself at home? · Watch your weight. A healthy weight helps your body use insulin properly. · Limit the amount of calories, sweets, and unhealthy fat you eat. Ask your doctor if you should see a dietitian.  A registered dietitian can help you create meal plans that fit your lifestyle. · Get at least 30 minutes of exercise on most days of the week. Exercise helps control your blood sugar. It also helps you maintain a healthy weight. Walking is a good choice. You also may want to do other activities, such as running, swimming, cycling, or playing tennis or team sports. · Do not smoke. Smoking can make prediabetes worse. If you need help quitting, talk to your doctor about stop-smoking programs and medicines. These can increase your chances of quitting for good. · If your doctor prescribed medicines, take them exactly as prescribed. Call your doctor if you think you are having a problem with your medicine. You will get more details on the specific medicines your doctor prescribes. When should you call for help? Watch closely for changes in your health, and be sure to contact your doctor if:  ? · You have any symptoms of diabetes. These may include:  ¨ Being thirsty more often. ¨ Urinating more. ¨ Being hungrier. ¨ Losing weight. ¨ Being very tired. ¨ Having blurry vision. ? · You have a wound that will not heal.   ? · You have an infection that will not go away. ? · You have problems with your blood pressure. ? · You want more information about diabetes and how you can keep from getting it. Where can you learn more? Go to http://adonay-isaiah.info/. Enter I222 in the search box to learn more about \"Prediabetes: Care Instructions. \"  Current as of: March 13, 2017  Content Version: 11.4  © 1531-5361 [employer]. Care instructions adapted under license by [employer] (which disclaims liability or warranty for this information). If you have questions about a medical condition or this instruction, always ask your healthcare professional. [place] [address] any warranty or liability for your use of this information.     Hyperlipidemia: After Your Visit  Your Care Instructions  Hyperlipidemia is too much fat in your blood. The body has several kinds of fat, including cholesterol and triglycerides. Your body needs fat for many things, such as making new cells. But too much fat in your blood increases your chances of having a heart attack or stroke. You may be able to lower your cholesterol and triglycerides with a heart-healthy diet, exercise, and if needed, medicine. Your doctor may want you to try lifestyle changes first to see whether they lower the fat in your blood. You may need to take medicine if lifestyle changes do not lower the fat in your blood enough. Follow-up care is a key part of your treatment and safety. Be sure to make and go to all appointments, and call your doctor if you are having problems. Its also a good idea to know your test results and keep a list of the medicines you take. How can you care for yourself at home? Take your medicines  · Take your medicines exactly as prescribed. Call your doctor if you think you are having a problem with your medicine. · If you take medicine to lower your cholesterol, go to follow-up visits. You will need to have blood tests. · Do not take large doses of niacin, which is a B vitamin, while taking medicine called statins. It may increase the chance of muscle pain and liver problems. · Talk to your doctor about avoiding grapefruit juice if you are taking statins. Grapefruit juice can raise the level of this medicine in your blood. This could increase side effects. Eat more fruits, vegetables, and fiber  · Fruits and vegetables have lots of nutrients that help protect against heart disease, and they have littleif anyfat. Try to eat at least five servings a day. Dark green, deep orange, or yellow fruits and vegetables are healthy choices. · Keep carrots, celery, and other veggies handy for snacks. Buy fruit that is in season and store it where you can see it so that you will be tempted to eat it.  Cook dishes that have a lot of veggies in them, such as stir-fries and soups. · Foods high in fiber may reduce your cholesterol and provide important vitamins and minerals. High-fiber foods include whole-grain cereals and breads, oatmeal, beans, brown rice, citrus fruits, and apples. · Buy whole-grain breads and cereals instead of white bread and pastries. Limit saturated fat  · Read food labels and try to avoid saturated fat and trans fat. They increase your risk of heart disease. · Use olive or canola oil when you cook. Try cholesterol-lowering spreads, such as Benecol or Take Control. · Bake, broil, grill, or steam foods instead of frying them. · Limit the amount of high-fat meats you eat, including hot dogs and sausages. Cut out all visible fat when you prepare meat. · Eat fish, skinless poultry, and soy products such as tofu instead of high-fat meats. Soybeans may be especially good for your heart. Eat at least two servings of fish a week. Certain fish, such as salmon, contain omega-3 fatty acids, which may help reduce your risk of heart attack. · Choose low-fat or fat-free milk and dairy products. Get exercise, limit alcohol, and quit smoking  · Get more exercise. Work with your doctor to set up an exercise program. Even if you can do only a small amount, exercise will help you get stronger, have more energy, and manage your weight and your stress. Walking is an easy way to get exercise. Gradually increase the amount you walk every day. Aim for at least 30 minutes on most days of the week. You also may want to swim, bike, or do other activities. · Limit alcohol to no more than 2 drinks a day for men and 1 drink a day for women. · Do not smoke. If you need help quitting, talk to your doctor about stop-smoking programs and medicines. These can increase your chances of quitting for good. When should you call for help? Call 911 anytime you think you may need emergency care.  For example, call if:  · You have symptoms of a heart attack. These may include:  ¨ Chest pain or pressure, or a strange feeling in the chest.  ¨ Sweating. ¨ Shortness of breath. ¨ Nausea or vomiting. ¨ Pain, pressure, or a strange feeling in the back, neck, jaw, or upper belly or in one or both shoulders or arms. ¨ Lightheadedness or sudden weakness. ¨ A fast or irregular heartbeat. After you call 911, the  may tell you to chew 1 adult-strength or 2 to 4 low-dose aspirin. Wait for an ambulance. Do not try to drive yourself. · You have signs of a stroke. These may include:  ¨ Sudden numbness, paralysis, or weakness in your face, arm, or leg, especially on only one side of your body. ¨ New problems with walking or balance. ¨ Sudden vision changes. ¨ Drooling or slurred speech. ¨ New problems speaking or understanding simple statements, or feeling confused. ¨ A sudden, severe headache that is different from past headaches. · You passed out (lost consciousness). Call your doctor now or seek immediate medical care if:  · You have muscle pain or weakness. Watch closely for changes in your health, and be sure to contact your doctor if:  · You are very tired. · You have an upset stomach, gas, constipation, or belly pain or cramps. Where can you learn more? Go to [employer].be  Enter C406 in the search box to learn more about \"Hyperlipidemia: After Your Visit. \"   © 4571-5393 [employer]. Care instructions adapted under license by [employer] (which disclaims liability or warranty for this information). This care instruction is for use with your licensed healthcare professional. If you have questions about a medical condition or this instruction, always ask your healthcare professional. [place] [address] any warranty or liability for your use of this information.   Content Version: 4.2.803270; Last Revised: October 13, 2011

## 2017-12-20 NOTE — PROGRESS NOTES
Chief Complaint   Patient presents with    Results   [employer] Annual Wellness Visit   Patient is here today for 6 mth F/U. Pt would like rx refill for Spirva for 12 months. 1. Have you been to the ER, urgent care clinic since your last visit? Hospitalized since your last visit? No    2. Have you seen or consulted any other health care providers outside of the [address] [place] [address] since your last visit? Include any pap smears or colon screening. No Dr Kathy Dodd Surgeon.

## 2017-12-20 NOTE — ACP (ADVANCE CARE PLANNING)
Advance Care Planning (ACP) Provider Conversation Snapshot    Date of ACP Conversation: 12/20/17  Persons included in Conversation:  patient  Length of ACP Conversation in minutes:  <16 minutes (Non-Billable)    Authorized Decision Maker (if patient is incapable of making informed decisions):    This person is:   Healthcare Agent/Medical Power of  under Advance Directive          For Patients with Decision Making Capacity:   Values/Goals: Exploration of values, goals, and preferences if recovery is not expected, even with continued medical treatment in the event of:  Imminent death    Conversation Outcomes / Follow-Up Plan:   Recommended review of completed ACP document annually or upon change in health status

## 2018-06-06 ENCOUNTER — HOSPITAL ENCOUNTER (OUTPATIENT)
Dept: LAB | Age: 78
Discharge: HOME | End: 2018-06-06

## 2018-06-06 PROCEDURE — 99001 SPECIMEN HANDLING PT-LAB: CPT | Performed by: HOSPITALIST

## 2018-06-07 LAB
25(OH)D3+25(OH)D2 SERPL-MCNC: 55.2 NG/ML (ref 30–100)
ALBUMIN SERPL-MCNC: 4.3 G/DL (ref 3.5–4.8)
ALBUMIN/GLOB SERPL: 2 {RATIO} (ref 1.2–2.2)
ALP SERPL-CCNC: 70 IU/L (ref 39–117)
ALT SERPL-CCNC: 15 IU/L (ref 0–32)
AST SERPL-CCNC: 22 IU/L (ref 0–40)
BASOPHILS # BLD AUTO: 0.1 X10E3/UL (ref 0–0.2)
BASOPHILS NFR BLD AUTO: 3 %
BILIRUB SERPL-MCNC: 0.4 MG/DL (ref 0–1.2)
BUN SERPL-MCNC: 16 MG/DL (ref 8–27)
BUN/CREAT SERPL: 20 (ref 12–28)
CALCIUM SERPL-MCNC: 9.3 MG/DL (ref 8.7–10.3)
CHLORIDE SERPL-SCNC: 102 MMOL/L (ref 96–106)
CHOLEST SERPL-MCNC: 165 MG/DL (ref 100–199)
CO2 SERPL-SCNC: 28 MMOL/L (ref 18–29)
CREAT SERPL-MCNC: 0.8 MG/DL (ref 0.57–1)
EOSINOPHIL # BLD AUTO: 0.4 X10E3/UL (ref 0–0.4)
EOSINOPHIL NFR BLD AUTO: 8 %
ERYTHROCYTE [DISTWIDTH] IN BLOOD BY AUTOMATED COUNT: 13 % (ref 12.3–15.4)
GFR SERPLBLD CREATININE-BSD FMLA CKD-EPI: 70 ML/MIN/1.73
GFR SERPLBLD CREATININE-BSD FMLA CKD-EPI: 81 ML/MIN/1.73
GLOBULIN SER CALC-MCNC: 2.2 G/DL (ref 1.5–4.5)
GLUCOSE SERPL-MCNC: 84 MG/DL (ref 65–99)
HBA1C MFR BLD: 5.3 % (ref 4.8–5.6)
HCT VFR BLD AUTO: 39.4 % (ref 34–46.6)
HDLC SERPL-MCNC: 69 MG/DL
HGB BLD-MCNC: 13.2 G/DL (ref 11.1–15.9)
IMM GRANULOCYTES # BLD: 0 X10E3/UL (ref 0–0.1)
IMM GRANULOCYTES NFR BLD: 0 %
LDLC SERPL CALC-MCNC: 80 MG/DL (ref 0–99)
LYMPHOCYTES # BLD AUTO: 1.4 X10E3/UL (ref 0.7–3.1)
LYMPHOCYTES NFR BLD AUTO: 30 %
MCH RBC QN AUTO: 30.8 PG (ref 26.6–33)
MCHC RBC AUTO-ENTMCNC: 33.5 G/DL (ref 31.5–35.7)
MCV RBC AUTO: 92 FL (ref 79–97)
MONOCYTES # BLD AUTO: 0.4 X10E3/UL (ref 0.1–0.9)
MONOCYTES NFR BLD AUTO: 8 %
NEUTROPHILS # BLD AUTO: 2.3 X10E3/UL (ref 1.4–7)
NEUTROPHILS NFR BLD AUTO: 51 %
PLATELET # BLD AUTO: 252 X10E3/UL (ref 150–379)
POTASSIUM SERPL-SCNC: 4.3 MMOL/L (ref 3.5–5.2)
PROT SERPL-MCNC: 6.5 G/DL (ref 6–8.5)
RBC # BLD AUTO: 4.28 X10E6/UL (ref 3.77–5.28)
SODIUM SERPL-SCNC: 143 MMOL/L (ref 134–144)
T4 FREE SERPL-MCNC: 1.11 NG/DL (ref 0.82–1.77)
TRIGL SERPL-MCNC: 80 MG/DL (ref 0–149)
TSH SERPL DL<=0.005 MIU/L-ACNC: 3.99 UIU/ML (ref 0.45–4.5)
VLDLC SERPL CALC-MCNC: 16 MG/DL (ref 5–40)
WBC # BLD AUTO: 4.6 X10E3/UL (ref 3.4–10.8)

## 2018-06-13 ENCOUNTER — OFFICE VISIT (OUTPATIENT)
Dept: FAMILY MEDICINE CLINIC | Age: 78
End: 2018-06-13

## 2018-06-13 VITALS
SYSTOLIC BLOOD PRESSURE: 151 MMHG | RESPIRATION RATE: 18 BRPM | HEART RATE: 60 BPM | OXYGEN SATURATION: 98 % | TEMPERATURE: 98.2 F | HEIGHT: 65 IN | DIASTOLIC BLOOD PRESSURE: 76 MMHG | WEIGHT: 143.6 LBS | BODY MASS INDEX: 23.93 KG/M2

## 2018-06-13 DIAGNOSIS — E55.9 HYPOVITAMINOSIS D: ICD-10-CM

## 2018-06-13 DIAGNOSIS — E78.00 PURE HYPERCHOLESTEROLEMIA: ICD-10-CM

## 2018-06-13 DIAGNOSIS — R73.03 PREDIABETES: Primary | ICD-10-CM

## 2018-06-13 DIAGNOSIS — Z79.899 ENCOUNTER FOR LONG-TERM (CURRENT) USE OF MEDICATIONS: ICD-10-CM

## 2018-06-13 DIAGNOSIS — I10 ESSENTIAL HYPERTENSION: ICD-10-CM

## 2018-06-13 RX ORDER — ZOSTER VACCINE RECOMBINANT, ADJUVANTED 50 MCG/0.5
KIT INTRAMUSCULAR
Refills: 0 | COMMUNITY
Start: 2018-05-10 | End: 2020-01-07

## 2018-06-13 NOTE — MR AVS SNAPSHOT
[address] [place] [address] 
 
 
 [address] [address] [id number] [id number] 
[phone] Patient: Roger Herzog MRN:  [id number] Visit Information Date & Time Provider Department Dept. Phone Encounter #  
 6/13/2018  7:30 AM Veronica Timmonsbandar [address] [id number] Follow-up Instructions Return in about 6 months (around 12/13/2018) for Labs 1 week before. Upcoming Health Maintenance Date Due DTaP/Tdap/Td series (1 - Tdap) 9/30/1961 Influenza Age 5 to Adult 8/1/2018 MEDICARE YEARLY EXAM 12/21/2018 GLAUCOMA SCREENING Q2Y 4/23/2020 Allergies as of 6/13/2018  Review Complete On: 6/13/2018 By: Daniel Vieira Severity Noted Reaction Type Reactions Augmentin [Amoxicillin-pot Clavulanate]  11/15/2012    Diarrhea  
 severe Sulfamerazine  12/20/2017    Diarrhea Current Immunizations  Reviewed on 12/20/2017 Name Date Influenza High Dose Vaccine PF 10/9/2015 Influenza Vaccine 11/23/2017, 10/5/2014, 9/22/2013 Influenza Vaccine Whole 9/28/2012 Pneumococcal Conjugate (PCV-13) 12/7/2015 ZZZ-RETIRED (DO NOT USE) Pneumococcal Vaccine (Unspecified Type) 11/11/2008 Zoster Vaccine, Live 11/11/2008 Not reviewed this visit You Were Diagnosed With   
  
 Codes Comments Prediabetes    -  Primary ICD-10-CM: R73.03 
ICD-9-CM: 790.29 Hypovitaminosis D     ICD-10-CM: E55.9 ICD-9-CM: 268.9 Essential hypertension     ICD-10-CM: I10 
ICD-9-CM: 401.9 Pure hypercholesterolemia     ICD-10-CM: E78.00 ICD-9-CM: 272.0 Encounter for long-term (current) use of medications     ICD-10-CM: Z79.899 ICD-9-CM: V58.69 Vitals BP Pulse Temp Resp Height(growth percentile) Weight(growth percentile) 151/76 (BP 1 Location: Left arm, BP Patient Position: Sitting) 60 98.2 °F (36.8 °C) (Oral) 18 5' 5\" (1.651 m) 143 lb 9.6 oz (65.1 kg) SpO2 BMI OB Status Smoking Status 98% 23.9 kg/m2 Hysterectomy Never Smoker BMI and BSA Data Body Mass Index Body Surface Area  
 23.9 kg/m 2 1.73 m 2 Preferred Pharmacy Pharmacy Name Phone [facility] [place] [facility] [phone] [facility] [address] [phone] Your Updated Medication List  
  
   
This list is accurate as of 6/13/18  8:20 AM.  Always use your most recent med list.  
  
  
  
  
 * albuterol 90 mcg/actuation inhaler Commonly known as:  PROVENTIL HFA, VENTOLIN HFA, PROAIR HFA Take 1 Puff by inhalation every four (4) hours as needed for Wheezing. * albuterol 2 mg tablet Commonly known as:  PROVENTIL Take 1 Tab by mouth every eight (8) hours as needed. amLODIPine 5 mg tablet Commonly known as:  Jayden Railing TAKE 1 TABLET DAILY FOR HYPERTENSION  
  
 budesonide-formoterol 160-4.5 mcg/actuation Hfaa Commonly known as:  SYMBICORT Take 2 Puffs by inhalation two (2) times a day. CALCIUM 500+D 500 mg(1,250mg) -200 unit per tablet Generic drug:  calcium-vitamin D Take 1 Tab by mouth two (2) times daily (with meals). With magnesium FISH OIL 1,200-144-216 mg Cap Generic drug:  fish oil-dha-epa Take 2 Caps by mouth daily. FLUAD 4428-3411 (65 YR UP)(PF) Syrg injection Generic drug:  influenza vaccine 2017-18 (65 yrs+)(PF)  
ADM 0.5ML IM UTD  
  
 fluticasone 50 mcg/actuation nasal spray Commonly known as:  Any Maker 2 Sprays by Both Nostrils route daily. Indications: ALLERGIC RHINITIS  
  
 glucosamine 1,000 mg Tab Take 1,000 mg by mouth daily. ICAPS MV PO Take 1 Tab by mouth four (4) times daily. SHINGRIX (PF) 50 mcg/0.5 mL Susr injection Generic drug:  varicella-zoster recombinant (PF) INJECT AS DIRECTED  
  
 simvastatin 20 mg tablet Commonly known as:  ZOCOR  
TAKE 1 TABLET EVERY NIGHT  
  
 STRONTIUM GLUCONATE-B6-B12-FA PO Take 1 Tab by mouth two (2) times a day. tiotropium 18 mcg inhalation capsule Commonly known as:  [address] [place] [address] [address] Take 1 Cap by inhalation daily. VITAMIN D 2,000 unit Cap capsule Generic drug:  Cholecalciferol (Vitamin D3) Take  by mouth daily. * Notice: This list has 2 medication(s) that are the same as other medications prescribed for you. Read the directions carefully, and ask your doctor or other care provider to review them with you. Follow-up Instructions Return in about 6 months (around 12/13/2018) for Labs 1 week before. To-Do List   
 12/10/2018 Lab:  CBC WITH AUTOMATED DIFF   
  
 12/10/2018 Lab:  HEMOGLOBIN A1C W/O EAG   
  
 12/10/2018 Lab:  LIPID PANEL   
  
 12/10/2018 Lab:  METABOLIC PANEL, COMPREHENSIVE   
  
 12/10/2018 Lab:  T4, FREE   
  
 12/10/2018 Lab:  TSH 3RD GENERATION   
  
 12/10/2018 Lab:  VITAMIN D, 25 HYDROXY Patient Instructions DASH Diet: Care Instructions Your Care Instructions The DASH diet is an eating plan that can help lower your blood pressure. DASH stands for Dietary Approaches to Stop Hypertension. Hypertension is high blood pressure. The DASH diet focuses on eating foods that are high in calcium, potassium, and magnesium. These nutrients can lower blood pressure. The foods that are highest in these nutrients are fruits, vegetables, low-fat dairy products, nuts, seeds, and legumes. But taking calcium, potassium, and magnesium supplements instead of eating foods that are high in those nutrients does not have the same effect. The DASH diet also includes whole grains, fish, and poultry. The DASH diet is one of several lifestyle changes your doctor may recommend to lower your high blood pressure. Your doctor may also want you to decrease the amount of sodium in your diet. Lowering sodium while following the DASH diet can lower blood pressure even further than just the DASH diet alone. Follow-up care is a key part of your treatment and safety.  Be sure to make and go to all appointments, and call your doctor if you are having problems. It's also a good idea to know your test results and keep a list of the medicines you take. How can you care for yourself at home? Following the DASH diet · Eat 4 to 5 servings of fruit each day. A serving is 1 medium-sized piece of fruit, ½ cup chopped or canned fruit, 1/4 cup dried fruit, or 4 ounces (½ cup) of fruit juice. Choose fruit more often than fruit juice. · Eat 4 to 5 servings of vegetables each day. A serving is 1 cup of lettuce or raw leafy vegetables, ½ cup of chopped or cooked vegetables, or 4 ounces (½ cup) of vegetable juice. Choose vegetables more often than vegetable juice. · Get 2 to 3 servings of low-fat and fat-free dairy each day. A serving is 8 ounces of milk, 1 cup of yogurt, or 1 ½ ounces of cheese. · Eat 6 to 8 servings of grains each day. A serving is 1 slice of bread, 1 ounce of dry cereal, or ½ cup of cooked rice, pasta, or cooked cereal. Try to choose whole-grain products as much as possible. · Limit lean meat, poultry, and fish to 2 servings each day. A serving is 3 ounces, about the size of a deck of cards. · Eat 4 to 5 servings of nuts, seeds, and legumes (cooked dried beans, lentils, and split peas) each week. A serving is 1/3 cup of nuts, 2 tablespoons of seeds, or ½ cup of cooked beans or peas. · Limit fats and oils to 2 to 3 servings each day. A serving is 1 teaspoon of vegetable oil or 2 tablespoons of salad dressing. · Limit sweets and added sugars to 5 servings or less a week. A serving is 1 tablespoon jelly or jam, ½ cup sorbet, or 1 cup of lemonade. · Eat less than 2,300 milligrams (mg) of sodium a day. If you limit your sodium to 1,500 mg a day, you can lower your blood pressure even more. Tips for success · Start small. Do not try to make dramatic changes to your diet all at once.  You might feel that you are missing out on your favorite foods and then be more likely to not follow the plan. Make small changes, and stick with them. Once those changes become habit, add a few more changes. · Try some of the following: ¨ Make it a goal to eat a fruit or vegetable at every meal and at snacks. This will make it easy to get the recommended amount of fruits and vegetables each day. ¨ Try yogurt topped with fruit and nuts for a snack or healthy dessert. ¨ Add lettuce, tomato, cucumber, and onion to sandwiches. ¨ Combine a ready-made pizza crust with low-fat mozzarella cheese and lots of vegetable toppings. Try using tomatoes, squash, spinach, broccoli, carrots, cauliflower, and onions. ¨ Have a variety of cut-up vegetables with a low-fat dip as an appetizer instead of chips and dip. ¨ Sprinkle sunflower seeds or chopped almonds over salads. Or try adding chopped walnuts or almonds to cooked vegetables. ¨ Try some vegetarian meals using beans and peas. Add garbanzo or kidney beans to salads. Make burritos and tacos with mashed francis beans or black beans. Where can you learn more? Go to http://adonay-isaiah.info/. Enter K215 in the search box to learn more about \"DASH Diet: Care Instructions. \" Current as of: September 21, 2016 Content Version: 11.4 © 0803-2636 [employer]. Care instructions adapted under license by [employer] (which disclaims liability or warranty for this information). If you have questions about a medical condition or this instruction, always ask your healthcare professional. [address] [address] any warranty or liability for your use of this information. Prediabetes: Care Instructions Your Care Instructions Prediabetes is a warning sign that you are at risk for getting type 2 diabetes. It means that your blood sugar is higher than it should be. The food you eat turns into sugar, which your body uses for energy.  Normally, an organ called the pancreas makes insulin, which allows the sugar in your blood to get into your body's cells. But when your body can't use insulin the right way, the sugar doesn't move into cells. It stays in your blood instead. This is called insulin resistance. The buildup of sugar in the blood causes prediabetes. The good news is that lifestyle changes may help you get your blood sugar back to normal and help you avoid or delay diabetes. Follow-up care is a key part of your treatment and safety. Be sure to make and go to all appointments, and call your doctor if you are having problems. It's also a good idea to know your test results and keep a list of the medicines you take. How can you care for yourself at home? · Watch your weight. A healthy weight helps your body use insulin properly. · Limit the amount of calories, sweets, and unhealthy fat you eat. Ask your doctor if you should see a dietitian. A registered dietitian can help you create meal plans that fit your lifestyle. · Get at least 30 minutes of exercise on most days of the week. Exercise helps control your blood sugar. It also helps you maintain a healthy weight. Walking is a good choice. You also may want to do other activities, such as running, swimming, cycling, or playing tennis or team sports. · Do not smoke. Smoking can make prediabetes worse. If you need help quitting, talk to your doctor about stop-smoking programs and medicines. These can increase your chances of quitting for good. · If your doctor prescribed medicines, take them exactly as prescribed. Call your doctor if you think you are having a problem with your medicine. You will get more details on the specific medicines your doctor prescribes. When should you call for help? Watch closely for changes in your health, and be sure to contact your doctor if: 
? · You have any symptoms of diabetes. These may include: ¨ Being thirsty more often. ¨ Urinating more. ¨ Being hungrier. ¨ Losing weight. ¨ Being very tired. ¨ Having blurry vision. ? · You have a wound that will not heal.  
? · You have an infection that will not go away. ? · You have problems with your blood pressure. ? · You want more information about diabetes and how you can keep from getting it. Where can you learn more? Go to http://adonay-isaiah.info/. Enter I222 in the search box to learn more about \"Prediabetes: Care Instructions. \" Current as of: March 13, 2017 Content Version: 11.4 © 6706-4998 [employer]. Care instructions adapted under license by [employer] (which disclaims liability or warranty for this information). If you have questions about a medical condition or this instruction, always ask your healthcare professional. [address] any warranty or liability for your use of this information. Introducing [facility]! Dear Nellie Maciel: 
Thank you for requesting a [employer] account. Our records indicate that you already have an active [employer] account. You can access your account anytime at [email or web address] Did you know that you can access your hospital and ER discharge instructions at any time in [employer]? You can also review all of your test results from your hospital stay or ER visit. Additional Information If you have questions, please visit the Frequently Asked Questions section of the [employer] website at [email or web address] Remember, [employer] is NOT to be used for urgent needs. For medical emergencies, dial 911. Now available from your iPhone and Android! Please provide this summary of care documentation to your next provider. Your primary care clinician is listed as Peter Lopez. If you have any questions after today's visit, please call [phone].

## 2018-06-13 NOTE — PATIENT INSTRUCTIONS
DASH Diet: Care Instructions  Your Care Instructions    The DASH diet is an eating plan that can help lower your blood pressure. DASH stands for Dietary Approaches to Stop Hypertension. Hypertension is high blood pressure. The DASH diet focuses on eating foods that are high in calcium, potassium, and magnesium. These nutrients can lower blood pressure. The foods that are highest in these nutrients are fruits, vegetables, low-fat dairy products, nuts, seeds, and legumes. But taking calcium, potassium, and magnesium supplements instead of eating foods that are high in those nutrients does not have the same effect. The DASH diet also includes whole grains, fish, and poultry. The DASH diet is one of several lifestyle changes your doctor may recommend to lower your high blood pressure. Your doctor may also want you to decrease the amount of sodium in your diet. Lowering sodium while following the DASH diet can lower blood pressure even further than just the DASH diet alone. Follow-up care is a key part of your treatment and safety. Be sure to make and go to all appointments, and call your doctor if you are having problems. It's also a good idea to know your test results and keep a list of the medicines you take. How can you care for yourself at home? Following the DASH diet  · Eat 4 to 5 servings of fruit each day. A serving is 1 medium-sized piece of fruit, ½ cup chopped or canned fruit, 1/4 cup dried fruit, or 4 ounces (½ cup) of fruit juice. Choose fruit more often than fruit juice. · Eat 4 to 5 servings of vegetables each day. A serving is 1 cup of lettuce or raw leafy vegetables, ½ cup of chopped or cooked vegetables, or 4 ounces (½ cup) of vegetable juice. Choose vegetables more often than vegetable juice. · Get 2 to 3 servings of low-fat and fat-free dairy each day. A serving is 8 ounces of milk, 1 cup of yogurt, or 1 ½ ounces of cheese. · Eat 6 to 8 servings of grains each day.  A serving is 1 slice of bread, 1 ounce of dry cereal, or ½ cup of cooked rice, pasta, or cooked cereal. Try to choose whole-grain products as much as possible. · Limit lean meat, poultry, and fish to 2 servings each day. A serving is 3 ounces, about the size of a deck of cards. · Eat 4 to 5 servings of nuts, seeds, and legumes (cooked dried beans, lentils, and split peas) each week. A serving is 1/3 cup of nuts, 2 tablespoons of seeds, or ½ cup of cooked beans or peas. · Limit fats and oils to 2 to 3 servings each day. A serving is 1 teaspoon of vegetable oil or 2 tablespoons of salad dressing. · Limit sweets and added sugars to 5 servings or less a week. A serving is 1 tablespoon jelly or jam, ½ cup sorbet, or 1 cup of lemonade. · Eat less than 2,300 milligrams (mg) of sodium a day. If you limit your sodium to 1,500 mg a day, you can lower your blood pressure even more. Tips for success  · Start small. Do not try to make dramatic changes to your diet all at once. You might feel that you are missing out on your favorite foods and then be more likely to not follow the plan. Make small changes, and stick with them. Once those changes become habit, add a few more changes. · Try some of the following:  ¨ Make it a goal to eat a fruit or vegetable at every meal and at snacks. This will make it easy to get the recommended amount of fruits and vegetables each day. ¨ Try yogurt topped with fruit and nuts for a snack or healthy dessert. ¨ Add lettuce, tomato, cucumber, and onion to sandwiches. ¨ Combine a ready-made pizza crust with low-fat mozzarella cheese and lots of vegetable toppings. Try using tomatoes, squash, spinach, broccoli, carrots, cauliflower, and onions. ¨ Have a variety of cut-up vegetables with a low-fat dip as an appetizer instead of chips and dip. ¨ Sprinkle sunflower seeds or chopped almonds over salads. Or try adding chopped walnuts or almonds to cooked vegetables.   ¨ Try some vegetarian meals using beans and peas. Add garbanzo or kidney beans to salads. Make burritos and tacos with mashed francis beans or black beans. Where can you learn more? Go to http://adonay-isaiah.info/. Enter Y304 in the search box to learn more about \"DASH Diet: Care Instructions. \"  Current as of: September 21, 2016  Content Version: 11.4  © 6416-0570 [employer]. Care instructions adapted under license by [employer] (which disclaims liability or warranty for this information). If you have questions about a medical condition or this instruction, always ask your healthcare professional. [address] any warranty or liability for your use of this information. Prediabetes: Care Instructions  Your Care Instructions    Prediabetes is a warning sign that you are at risk for getting type 2 diabetes. It means that your blood sugar is higher than it should be. The food you eat turns into sugar, which your body uses for energy. Normally, an organ called the pancreas makes insulin, which allows the sugar in your blood to get into your body's cells. But when your body can't use insulin the right way, the sugar doesn't move into cells. It stays in your blood instead. This is called insulin resistance. The buildup of sugar in the blood causes prediabetes. The good news is that lifestyle changes may help you get your blood sugar back to normal and help you avoid or delay diabetes. Follow-up care is a key part of your treatment and safety. Be sure to make and go to all appointments, and call your doctor if you are having problems. It's also a good idea to know your test results and keep a list of the medicines you take. How can you care for yourself at home? · Watch your weight. A healthy weight helps your body use insulin properly. · Limit the amount of calories, sweets, and unhealthy fat you eat. Ask your doctor if you should see a dietitian.  A registered dietitian can help you create meal plans that fit your lifestyle. · Get at least 30 minutes of exercise on most days of the week. Exercise helps control your blood sugar. It also helps you maintain a healthy weight. Walking is a good choice. You also may want to do other activities, such as running, swimming, cycling, or playing tennis or team sports. · Do not smoke. Smoking can make prediabetes worse. If you need help quitting, talk to your doctor about stop-smoking programs and medicines. These can increase your chances of quitting for good. · If your doctor prescribed medicines, take them exactly as prescribed. Call your doctor if you think you are having a problem with your medicine. You will get more details on the specific medicines your doctor prescribes. When should you call for help? Watch closely for changes in your health, and be sure to contact your doctor if:  ? · You have any symptoms of diabetes. These may include:  ¨ Being thirsty more often. ¨ Urinating more. ¨ Being hungrier. ¨ Losing weight. ¨ Being very tired. ¨ Having blurry vision. ? · You have a wound that will not heal.   ? · You have an infection that will not go away. ? · You have problems with your blood pressure. ? · You want more information about diabetes and how you can keep from getting it. Where can you learn more? Go to http://adonay-isaiah.info/. Enter I222 in the search box to learn more about \"Prediabetes: Care Instructions. \"  Current as of: March 13, 2017  Content Version: 11.4  © 5907-6293 [employer]. Care instructions adapted under license by [employer] (which disclaims liability or warranty for this information). If you have questions about a medical condition or this instruction, always ask your healthcare professional. [address] [address] any warranty or liability for your use of this information.

## 2018-06-13 NOTE — PROGRESS NOTES
Chief Complaint   Patient presents with    Labs     results       HPI:   Patient is a 76year old  female with medical problems listed below presents today for follow up of Hypertension, Hyperlipidemia, Prediabetes, Hypovitaminosis D, etc. She has been feeling well and voices no complaints today. She is complaint with her medications with no adverse effects reported. She has COPD that is improved with Spiriva and Symbicort and she is followed by pulmonologist Dr. Neri Angel with [facility]. She continues to maintain an active lifestyle via swimming and walking daily. Chrystie Every  Past Medical History:   Diagnosis Date    Allergic rhinitis     AR (allergic rhinitis) 11/16/2011    Cataract     Chronic sinusitis     Hyperlipidaemia     Hyperlipidemia 11/16/2011    Hypertension     Mild Diastolic    Low ferritin level     Osteopenia 11/16/2011    Osteoporosis     Pneumonia     Vitamin D deficiencies     Mild     Allergies   Allergen Reactions    Augmentin [Amoxicillin-Pot Clavulanate] Diarrhea     severe    Sulfamerazine Diarrhea     Current Outpatient Prescriptions   Medication Sig Dispense Refill    simvastatin (ZOCOR) 20 mg tablet TAKE 1 TABLET EVERY NIGHT 90 Tab 3    FLUAD [id number], 65 YR UP,,PF, syrg injection ADM 0.5ML IM UTD  0    tiotropium (SPIRIVA WITH HANDIHALER) 18 mcg inhalation capsule Take 1 Cap by inhalation daily. 90 Cap 2    amLODIPine (NORVASC) 5 mg tablet TAKE 1 TABLET DAILY FOR HYPERTENSION 90 Tab 3    MULTIVIT-MIN/FA/LUTEIN/ZEAXANT (ICAPS MV PO) Take 1 Tab by mouth four (4) times daily.  glucosamine 1,000 mg Tab Take 1,000 mg by mouth daily.  calcium-vitamin D (CALCIUM 500+D) 500 mg(1,250mg) -200 unit per tablet Take 1 Tab by mouth two (2) times daily (with meals). With magnesium       fish oil-dha-epa (FISH OIL) 1,200-144-216 mg Cap Take 2 Caps by mouth daily.  STRONTIUM GLUCONATE-B6-B12-FA PO Take 1 Tab by mouth two (2) times a day.       SHINGRIX, PF, 50 mcg/0.5 mL susr injection INJECT AS DIRECTED  0    budesonide-formoterol (SYMBICORT) 160-4.5 mcg/actuation HFA inhaler Take 2 Puffs by inhalation two (2) times a day. 1 Inhaler 5    albuterol (PROVENTIL HFA, VENTOLIN HFA, PROAIR HFA) 90 mcg/actuation inhaler Take 1 Puff by inhalation every four (4) hours as needed for Wheezing. 1 Inhaler 3    albuterol (PROVENTIL) 2 mg tablet Take 1 Tab by mouth every eight (8) hours as needed. 60 Tab 3    fluticasone (FLONASE) 50 mcg/actuation nasal spray 2 Sprays by Both Nostrils route daily. Indications: ALLERGIC RHINITIS 3 Bottle 3    Cholecalciferol, Vitamin D3, (VITAMIN D) 2,000 unit Cap Take  by mouth daily. ROS:  Constitutional: Negative for fever, chills, or fatigue  Neurological: Negative for headache, dizziness, visual disturbance, or loss of conciousness  Respiratory: Negative for SOB or cough. Cardiovascular: Negative for chest pain, palpitation, or leg swelling  Gastrointestinal: Negative for abdominal pain, nausea, vomiting, diarrhea, blood in stool, melena, or heartburn  Musculoskeletal: Negative for falls      Physical Exam:  Visit Vitals    /76 (BP 1 Location: Left arm, BP Patient Position: Sitting)    Pulse 60    Temp 98.2 °F (36.8 °C) (Oral)    Resp 18    Ht 5' 5\" (1.651 m)    Wt 143 lb 9.6 oz (65.1 kg)    SpO2 98%    BMI 23.9 kg/m2     Constitutional: a & o x 3, in no acute distress, well nourished, and interacting appropriately  Respiratory: symmetrical chest expansion, lungs clear to auscultation bilaterally, good air entry, normal respiratory effort, no wheezes or crackles  CV:normal S1S2, regular rate and rhythm, no murmurs, no carotid bruits, no JVD, pulses palpable, no thrills  ABD: Soft, non tender, non distended, no organomegaly, positive bowel sounds  EXT: No edema noted on bilateral lower extremity      Assessment/Plan:    ICD-10-CM ICD-9-CM    1.  Prediabetes R73.03 790.29 Recent HBA1C is improved at 5.3.  Counseled on diet and exercise. HEMOGLOBIN A1C W/O EAG   2. Hypovitaminosis D E55.9 268.9 Recent Vit D is good. VITAMIN D, 25 HYDROXY   3. Essential hypertension I10 401.9 Fair control  Continue current mesd and she was counseled on low salt diet. CBC WITH AUTOMATED DIFF      METABOLIC PANEL, COMPREHENSIVE      T4, FREE      TSH 3RD GENERATION   4. Pure hypercholesterolemia E78.00 272.0 Recent lipid panel done 6/6/18 reviewed with pt and improved revealing Cho 165 and LDL 80. Continue Simvastatin 20 mg daily and she was counseled on low fat diet. LIPID PANEL   5. Encounter for long-term (current) use of medications Z79.899 V58.69 HEMOGLOBIN A1C W/O EAG         Orders Placed This Encounter    CBC WITH AUTOMATED DIFF     Standing Status:   Future     Standing Expiration Date:   1/9/2019    HEMOGLOBIN A1C W/O EAG     Standing Status:   Future     Standing Expiration Date:   6/14/2019    LIPID PANEL     Standing Status:   Future     Standing Expiration Date:   6/3/0315    METABOLIC PANEL, COMPREHENSIVE     Standing Status:   Future     Standing Expiration Date:   1/9/2019    T4, FREE     Standing Status:   Future     Standing Expiration Date:   1/9/2019    TSH 3RD GENERATION     Standing Status:   Future     Standing Expiration Date:   1/9/2019    VITAMIN D, 25 HYDROXY     Standing Status:   Future     Standing Expiration Date:   1/9/2019    SHINGRIX, PF, 50 mcg/0.5 mL susr injection     Sig: INJECT AS DIRECTED     Refill:  0       Recent labs reviewed with pt       Additional Notes: Discussed today's diagnosis and treatment plans. Medication indications and adverse effects discussed. After Visit Summary: Provided and discussed printed patient instructions. Questions in relation to diagnosis answered. Follow-up disposition:  In 6 months with labs 1 week prior        Job DO John, MPH  Internal Medicine

## 2018-06-13 NOTE — PROGRESS NOTES
Sonia Lucero is a  76 y.o. female presents today for office visit for routine follow up. 1. Have you been to the ER, urgent care clinic or hospitalized since your last visit? NO    2. Have you seen or consulted any other health care providers outside of the [address] [place] [address] since your last visit (Include any pap smears or colon screening)? YES,  Dr Noam Pisano Surgeon

## 2018-07-09 DIAGNOSIS — J45.909 RAD (REACTIVE AIRWAY DISEASE), UNSPECIFIED ASTHMA SEVERITY, UNCOMPLICATED: ICD-10-CM

## 2018-07-11 NOTE — TELEPHONE ENCOUNTER
Requested Prescriptions     Pending Prescriptions Disp Refills    tiotropium (SPIRIVA WITH HANDIHALER) 18 mcg inhalation capsule 30 Cap 11     Sig: Take 1 Cap by inhalation daily. Pt request due to cost of 90 day supply to received 30 day supply with refills for a year.

## 2018-12-03 ENCOUNTER — DOCUMENTATION ONLY (OUTPATIENT)
Dept: FAMILY MEDICINE CLINIC | Age: 78
End: 2018-12-03

## 2018-12-03 ENCOUNTER — HOSPITAL ENCOUNTER (OUTPATIENT)
Dept: LAB | Age: 78
Discharge: HOME | End: 2018-12-03

## 2018-12-03 PROCEDURE — 99001 SPECIMEN HANDLING PT-LAB: CPT

## 2018-12-03 NOTE — PROGRESS NOTES
Patient dropped off paperwork for [facility]. Form placed in Dr Angela Norman office. Copy in CC.

## 2018-12-10 ENCOUNTER — OFFICE VISIT (OUTPATIENT)
Dept: FAMILY MEDICINE CLINIC | Age: 78
End: 2018-12-10

## 2018-12-10 VITALS
BODY MASS INDEX: 23.79 KG/M2 | RESPIRATION RATE: 18 BRPM | OXYGEN SATURATION: 95 % | HEIGHT: 65 IN | TEMPERATURE: 97.8 F | SYSTOLIC BLOOD PRESSURE: 143 MMHG | WEIGHT: 142.8 LBS | HEART RATE: 69 BPM | DIASTOLIC BLOOD PRESSURE: 79 MMHG

## 2018-12-10 DIAGNOSIS — N39.0 URINARY TRACT INFECTION WITHOUT HEMATURIA, SITE UNSPECIFIED: ICD-10-CM

## 2018-12-10 DIAGNOSIS — E55.9 HYPOVITAMINOSIS D: ICD-10-CM

## 2018-12-10 DIAGNOSIS — J44.9 CHRONIC OBSTRUCTIVE PULMONARY DISEASE, UNSPECIFIED COPD TYPE (HCC): ICD-10-CM

## 2018-12-10 DIAGNOSIS — E78.00 PURE HYPERCHOLESTEROLEMIA: ICD-10-CM

## 2018-12-10 DIAGNOSIS — Z23 ENCOUNTER FOR IMMUNIZATION: ICD-10-CM

## 2018-12-10 DIAGNOSIS — I10 ESSENTIAL HYPERTENSION: Primary | ICD-10-CM

## 2018-12-10 DIAGNOSIS — R73.03 PREDIABETES: ICD-10-CM

## 2018-12-10 LAB
BILIRUB UR QL STRIP: NEGATIVE
GLUCOSE UR-MCNC: NEGATIVE MG/DL
KETONES P FAST UR STRIP-MCNC: NEGATIVE MG/DL
PH UR STRIP: 8.5 [PH] (ref 4.6–8)
PROT UR QL STRIP: NEGATIVE
SP GR UR STRIP: 1.01 (ref 1–1.03)
UA UROBILINOGEN AMB POC: ABNORMAL (ref 0.2–1)
URINALYSIS CLARITY POC: CLEAR
URINALYSIS COLOR POC: YELLOW
URINE BLOOD POC: ABNORMAL
URINE LEUKOCYTES POC: ABNORMAL
URINE NITRITES POC: NEGATIVE

## 2018-12-10 RX ORDER — CEPHALEXIN 500 MG/1
500 CAPSULE ORAL 2 TIMES DAILY
Qty: 6 CAP | Refills: 0 | Status: SHIPPED | OUTPATIENT
Start: 2018-12-10 | End: 2018-12-13

## 2018-12-10 NOTE — PATIENT INSTRUCTIONS
DASH Diet: Care Instructions  Your Care Instructions    The DASH diet is an eating plan that can help lower your blood pressure. DASH stands for Dietary Approaches to Stop Hypertension. Hypertension is high blood pressure. The DASH diet focuses on eating foods that are high in calcium, potassium, and magnesium. These nutrients can lower blood pressure. The foods that are highest in these nutrients are fruits, vegetables, low-fat dairy products, nuts, seeds, and legumes. But taking calcium, potassium, and magnesium supplements instead of eating foods that are high in those nutrients does not have the same effect. The DASH diet also includes whole grains, fish, and poultry. The DASH diet is one of several lifestyle changes your doctor may recommend to lower your high blood pressure. Your doctor may also want you to decrease the amount of sodium in your diet. Lowering sodium while following the DASH diet can lower blood pressure even further than just the DASH diet alone. Follow-up care is a key part of your treatment and safety. Be sure to make and go to all appointments, and call your doctor if you are having problems. It's also a good idea to know your test results and keep a list of the medicines you take. How can you care for yourself at home? Following the DASH diet  · Eat 4 to 5 servings of fruit each day. A serving is 1 medium-sized piece of fruit, ½ cup chopped or canned fruit, 1/4 cup dried fruit, or 4 ounces (½ cup) of fruit juice. Choose fruit more often than fruit juice. · Eat 4 to 5 servings of vegetables each day. A serving is 1 cup of lettuce or raw leafy vegetables, ½ cup of chopped or cooked vegetables, or 4 ounces (½ cup) of vegetable juice. Choose vegetables more often than vegetable juice. · Get 2 to 3 servings of low-fat and fat-free dairy each day. A serving is 8 ounces of milk, 1 cup of yogurt, or 1 ½ ounces of cheese. · Eat 6 to 8 servings of grains each day.  A serving is 1 slice of bread, 1 ounce of dry cereal, or ½ cup of cooked rice, pasta, or cooked cereal. Try to choose whole-grain products as much as possible. · Limit lean meat, poultry, and fish to 2 servings each day. A serving is 3 ounces, about the size of a deck of cards. · Eat 4 to 5 servings of nuts, seeds, and legumes (cooked dried beans, lentils, and split peas) each week. A serving is 1/3 cup of nuts, 2 tablespoons of seeds, or ½ cup of cooked beans or peas. · Limit fats and oils to 2 to 3 servings each day. A serving is 1 teaspoon of vegetable oil or 2 tablespoons of salad dressing. · Limit sweets and added sugars to 5 servings or less a week. A serving is 1 tablespoon jelly or jam, ½ cup sorbet, or 1 cup of lemonade. · Eat less than 2,300 milligrams (mg) of sodium a day. If you limit your sodium to 1,500 mg a day, you can lower your blood pressure even more. Tips for success  · Start small. Do not try to make dramatic changes to your diet all at once. You might feel that you are missing out on your favorite foods and then be more likely to not follow the plan. Make small changes, and stick with them. Once those changes become habit, add a few more changes. · Try some of the following:  ? Make it a goal to eat a fruit or vegetable at every meal and at snacks. This will make it easy to get the recommended amount of fruits and vegetables each day. ? Try yogurt topped with fruit and nuts for a snack or healthy dessert. ? Add lettuce, tomato, cucumber, and onion to sandwiches. ? Combine a ready-made pizza crust with low-fat mozzarella cheese and lots of vegetable toppings. Try using tomatoes, squash, spinach, broccoli, carrots, cauliflower, and onions. ? Have a variety of cut-up vegetables with a low-fat dip as an appetizer instead of chips and dip. ? Sprinkle sunflower seeds or chopped almonds over salads. Or try adding chopped walnuts or almonds to cooked vegetables.   ? Try some vegetarian meals using beans and peas. Add garbanzo or kidney beans to salads. Make burritos and tacos with mashed francis beans or black beans. Where can you learn more? Go to http://adonay-isaiah.info/. Enter V028 in the search box to learn more about \"DASH Diet: Care Instructions. \"  Current as of: December 6, 2017  Content Version: 11.8  © 2971-1582 [employer]. Care instructions adapted under license by [employer] (which disclaims liability or warranty for this information). If you have questions about a medical condition or this instruction, always ask your healthcare professional. [address] any warranty or liability for your use of this information. Prediabetes: Care Instructions  Your Care Instructions    Prediabetes is a warning sign that you are at risk for getting type 2 diabetes. It means that your blood sugar is higher than it should be. The food you eat turns into sugar, which your body uses for energy. Normally, an organ called the pancreas makes insulin, which allows the sugar in your blood to get into your body's cells. But when your body can't use insulin the right way, the sugar doesn't move into cells. It stays in your blood instead. This is called insulin resistance. The buildup of sugar in the blood causes prediabetes. The good news is that lifestyle changes may help you get your blood sugar back to normal and help you avoid or delay diabetes. Follow-up care is a key part of your treatment and safety. Be sure to make and go to all appointments, and call your doctor if you are having problems. It's also a good idea to know your test results and keep a list of the medicines you take. How can you care for yourself at home? · Watch your weight. A healthy weight helps your body use insulin properly. · Limit the amount of calories, sweets, and unhealthy fat you eat. Ask your doctor if you should see a dietitian.  A registered dietitian can help you create meal plans that fit your lifestyle. · Get at least 30 minutes of exercise on most days of the week. Exercise helps control your blood sugar. It also helps you maintain a healthy weight. Walking is a good choice. You also may want to do other activities, such as running, swimming, cycling, or playing tennis or team sports. · Do not smoke. Smoking can make prediabetes worse. If you need help quitting, talk to your doctor about stop-smoking programs and medicines. These can increase your chances of quitting for good. · If your doctor prescribed medicines, take them exactly as prescribed. Call your doctor if you think you are having a problem with your medicine. You will get more details on the specific medicines your doctor prescribes. When should you call for help? Watch closely for changes in your health, and be sure to contact your doctor if:    · You have any symptoms of diabetes. These may include:  ? Being thirsty more often. ? Urinating more. ? Being hungrier. ? Losing weight. ? Being very tired. ? Having blurry vision.     · You have a wound that will not heal.     · You have an infection that will not go away.     · You have problems with your blood pressure.     · You want more information about diabetes and how you can keep from getting it. Where can you learn more? Go to http://adonay-isaiah.info/. Enter I222 in the search box to learn more about \"Prediabetes: Care Instructions. \"  Current as of: December 7, 2017  Content Version: 11.8  © 6192-5303 [employer]. Care instructions adapted under license by [employer] (which disclaims liability or warranty for this information). If you have questions about a medical condition or this instruction, always ask your healthcare professional. [address] any warranty or liability for your use of this information.

## 2018-12-10 NOTE — PROGRESS NOTES
Chief Complaint   Patient presents with    Results     labs       HPI:   Patient is an [de-identified]year old  female with medical problems listed below presents today for follow up of Hypertension, Hyperlipidemia, Prediabetes, Hypovitaminosis D, etc. She has been feeling well and voices no complaints today. She is complaint with her medications with no adverse effects reported. She has COPD that is improved with Spiriva and Symbicort and she is followed by pulmonologist Dr. Aleksandar Bhatt with [facility]. She continues to maintain an active lifestyle via swimming and walking daily. She brought in forms from [employer] to be filled out today. Past Medical History:   Diagnosis Date    Allergic rhinitis     AR (allergic rhinitis) 11/16/2011    Cataract     Chronic sinusitis     Hyperlipidaemia     Hyperlipidemia 11/16/2011    Hypertension     Mild Diastolic    Low ferritin level     Osteopenia 11/16/2011    Osteoporosis     Pneumonia     Vitamin D deficiencies     Mild     Allergies   Allergen Reactions    Augmentin [Amoxicillin-Pot Clavulanate] Diarrhea     severe    Sulfamerazine Diarrhea     Current Outpatient Medications   Medication Sig Dispense Refill    amLODIPine (NORVASC) 5 mg tablet TAKE 1 TABLET DAILY FOR HYPERTENSION 90 Tab 3    tiotropium (SPIRIVA WITH HANDIHALER) 18 mcg inhalation capsule Take 1 Cap by inhalation daily. 30 Cap 11    SHINGRIX, PF, 50 mcg/0.5 mL susr injection INJECT AS DIRECTED  0    simvastatin (ZOCOR) 20 mg tablet TAKE 1 TABLET EVERY NIGHT 90 Tab 3    FLUAD [id number], 65 YR UP,,PF, syrg injection ADM 0.5ML IM UTD  0    budesonide-formoterol (SYMBICORT) 160-4.5 mcg/actuation HFA inhaler Take 2 Puffs by inhalation two (2) times a day. 1 Inhaler 5    MULTIVIT-MIN/FA/LUTEIN/ZEAXANT (ICAPS MV PO) Take 1 Tab by mouth four (4) times daily.  glucosamine 1,000 mg Tab Take 1,000 mg by mouth daily.       Cholecalciferol, Vitamin D3, (VITAMIN D) 2,000 unit Cap Take  by mouth daily.      calcium-vitamin D (CALCIUM 500+D) 500 mg(1,250mg) -200 unit per tablet Take 1 Tab by mouth two (2) times daily (with meals). With magnesium       fish oil-dha-epa (FISH OIL) 1,200-144-216 mg Cap Take 2 Caps by mouth daily.  STRONTIUM GLUCONATE-B6-B12-FA PO Take 1 Tab by mouth two (2) times a day.  albuterol (PROVENTIL HFA, VENTOLIN HFA, PROAIR HFA) 90 mcg/actuation inhaler Take 1 Puff by inhalation every four (4) hours as needed for Wheezing. 1 Inhaler 3    albuterol (PROVENTIL) 2 mg tablet Take 1 Tab by mouth every eight (8) hours as needed. 60 Tab 3    fluticasone (FLONASE) 50 mcg/actuation nasal spray 2 Sprays by Both Nostrils route daily. Indications: ALLERGIC RHINITIS 3 Bottle 3         ROS:  Constitutional: Negative for fever, chills, or fatigue  Neurological: Negative for headache, dizziness, visual disturbance, or loss of conciousness  Respiratory: Negative for SOB or cough.    Cardiovascular: Negative for chest pain, palpitation, or leg swelling  Gastrointestinal: Negative for abdominal pain, nausea, vomiting, diarrhea, blood in stool, melena, or heartburn  Musculoskeletal: Negative for falls      Physical Exam:  Visit Vitals  /79   Pulse 69   Temp 97.8 °F (36.6 °C) (Oral)   Resp 18   Ht 5' 5\" (1.651 m)   Wt 142 lb 12.8 oz (64.8 kg)   SpO2 95%   BMI 23.76 kg/m²     Constitutional: a & o x 3, in no acute distress, well nourished, and interacting appropriately  Skin: warm and dry, no rashes , no bruises  Neck: supple, symmetrical, no thyromegaly  Respiratory: symmetrical chest expansion, lung sounds clear bilaterally, good air entry  Cardiovascular: normal S1S2, regular rate and rhythm, no murmurs  Abdomen: non-distended, normoactive bowel sounds x 4 quadrants, soft, non-tender to palpation  Musculoskeletal: normal ROM on all joints, no swelling or deformity, no perilumbar tenderness, steady gait  Diabetic foot exam: pedal pulses palpable  Psychiatry: appropriate mood and affect  Extremity: No edema noted        Assessment/Plan:    ICD-10-CM ICD-9-CM    1. Essential hypertension I10 401.9 Controlled  Continue current meds and she was counseled on low salt diet. CBC WITH AUTOMATED DIFF      METABOLIC PANEL, COMPREHENSIVE      TSH 3RD GENERATION   2. Encounter for immunization Z23 V03.89 T dap given today  ADMIN INFLUENZA VIRUS VAC      TETANUS, DIPHTHERIA TOXOIDS AND ACELLULAR PERTUSSIS VACCINE (TDAP), IN INDIVIDS. >=7, IM   3. Pure hypercholesterolemia E78.00 272.0 Recent lipid panel reviewed with pt and sujatha. Continue Simvastatin 20 mg daily and she was counseled on low fat diet. LIPID PANEL   4. Hypovitaminosis D E55.9 268.9 Recent Vit D is good  VITAMIN D, 25 HYDROXY   5. Prediabetes R73.03 790.29 HEMOGLOBIN A1C W/O EAG   6. Chronic obstructive pulmonary disease, unspecified [facility] ([facility] J44.9 496 Stable   7. Urinary tract infection without hematuria, site unspecified N39.0 599.0 Recent UA done today revealed 1 + LE.   Keflex started today and will check urine culture  CULTURE, URINE      AMB POC URINALYSIS DIP STICK AUTO W/O MICRO         Orders Placed This Encounter    CULTURE, URINE     Standing Status:   Future     Standing Expiration Date:   12/11/2019    Tetanus, diphtheria toxoids and acellular pertussis (TDAP) vaccine, in individuals >=7 years, IM    CBC WITH AUTOMATED DIFF     Standing Status:   Future     Standing Expiration Date:   7/8/2019    HEMOGLOBIN A1C W/O EAG     Standing Status:   Future     Standing Expiration Date:   12/11/2019    LIPID PANEL     Standing Status:   Future     Standing Expiration Date:   2/3/9042    METABOLIC PANEL, COMPREHENSIVE     Standing Status:   Future     Standing Expiration Date:   7/8/2019    TSH 3RD GENERATION     Standing Status:   Future     Standing Expiration Date:   7/8/2019    VITAMIN D, 25 HYDROXY     Standing Status:   Future     Standing Expiration Date:   7/8/2019    AMB POC URINALYSIS DIP STICK AUTO W/O MICRO  Administration fee () for Medicare insured patients    cephALEXin (KEFLEX) 500 mg capsule     Sig: Take 1 Cap by mouth two (2) times a day for 3 days. Dispense:  6 Cap     Refill:  0         Recent labs reviewed with pt       Additional Notes: Discussed today's diagnosis and treatment plans. Medication indications and adverse effects discussed. After Visit Summary: Provided and discussed printed patient instructions. Questions in relation to diagnosis answered. Follow-up disposition:  In 3 months with labs 1 week prior        Santi Flowers DO, MPH  Internal Medicine

## 2018-12-10 NOTE — PROGRESS NOTES
Roderick Alexander is a  [de-identified] y.o. female presents today for office visit for routine follow up    1. Have you been to the ER, urgent care clinic or hospitalized since your last visit? NO    2. Have you seen or consulted any other health care providers outside of the [address] [place] [address] since your last visit (Include any pap smears or colon screening)? NO  VORB: Administer TDAP./Thompson Winslow DO  /[place] Elta Burkitt , CCT  Patient received TDAPvaccine 0.5ml in Left deltoid. Tolerated well. No signs or symptoms of distress noted. Most current VIS given and consent signed. she was observed for 10 min post injection. There were no reactions observed.

## 2018-12-12 ENCOUNTER — PATIENT MESSAGE (OUTPATIENT)
Dept: FAMILY MEDICINE CLINIC | Age: 78
End: 2018-12-12

## 2018-12-12 NOTE — TELEPHONE ENCOUNTER
Regarding: Non-Urgent Medical Question  Contact: [phone]  ----- Message from [address] [place] [address] [address], Generic sent at 12/12/2018 10:47 AM EST -----    Just a thank you to Dr. Mimi Ricardo for taking time to fill out my health form. I appreciate the care.  Pipo Julian

## 2019-05-21 ENCOUNTER — OFFICE VISIT (OUTPATIENT)
Dept: FAMILY MEDICINE CLINIC | Age: 79
End: 2019-05-21

## 2019-05-21 VITALS
RESPIRATION RATE: 20 BRPM | TEMPERATURE: 101.6 F | SYSTOLIC BLOOD PRESSURE: 138 MMHG | HEIGHT: 65 IN | OXYGEN SATURATION: 93 % | BODY MASS INDEX: 23.49 KG/M2 | WEIGHT: 141 LBS | DIASTOLIC BLOOD PRESSURE: 60 MMHG | HEART RATE: 90 BPM

## 2019-05-21 DIAGNOSIS — R11.15 NON-INTRACTABLE CYCLICAL VOMITING WITH NAUSEA: ICD-10-CM

## 2019-05-21 DIAGNOSIS — J18.9 COMMUNITY ACQUIRED PNEUMONIA OF RIGHT MIDDLE LOBE OF LUNG: Primary | ICD-10-CM

## 2019-05-21 RX ORDER — PROMETHAZINE HYDROCHLORIDE 25 MG/1
25 TABLET ORAL
Qty: 30 TAB | Refills: 0 | Status: SHIPPED | OUTPATIENT
Start: 2019-05-21 | End: 2019-06-05

## 2019-05-21 RX ORDER — CHOLECALCIFEROL TAB 125 MCG (5000 UNIT) 125 MCG
TAB ORAL DAILY
COMMUNITY

## 2019-05-21 NOTE — PROGRESS NOTES
Patient is here today for ER visit follow up, weakness, nausea and fever. 1. Have you been to the ER, urgent care clinic since your last visit? Hospitalized since your last visit? Yes [facility] 5/18/19 for chest, shoulder and groin pain on the right side. 2. Have you seen or consulted any other health care providers outside of the [address] [place] [address] since your last visit? Include any pap smears or colon screening.  [facility] Pulmonologist

## 2019-05-21 NOTE — PATIENT INSTRUCTIONS
Walking Pneumonia: Care Instructions Your Care Instructions Walking pneumonia is an infection of the lungs caused by the mycoplasma bacteria. This form of pneumonia is usually mild and feels like a chest cold, but it can get worse. The symptoms of cough, headache, and a low fever start slowly. The infection is usually so mild that you may walk around with it without knowing that you have it. Most people don't get sick enough to be in the hospital. 
Walking pneumonia is usually treated with antibiotics. Your cough may last for 2 to 3 weeks after the infection has been treated. You may have some wheezing too. These symptoms will go away over time. Follow-up care is a key part of your treatment and safety. Be sure to make and go to all appointments, and call your doctor if you are having problems. It's also a good idea to know your test results and keep a list of the medicines you take. How can you care for yourself at home? · Be safe with medicines. Take your medicines exactly as prescribed. Call your doctor if you think you are having a problem with your medicine. · Take your antibiotics as directed. Do not stop taking them just because you feel better. You need to take the full course of antibiotics. · Ask your doctor if you can take an over-the-counter pain medicine, such as acetaminophen (Tylenol), ibuprofen (Advil, Motrin), or naproxen (Aleve). Read and follow all instructions on the label. · Do not take two or more pain medicines at the same time unless the doctor told you to. Many pain medicines have acetaminophen, which is Tylenol. Too much acetaminophen (Tylenol) can be harmful. · Take care of your cough so you can rest. A cough that brings up mucus from your lungs is common with pneumonia. It is one way your body gets rid of the infection. But if coughing keeps you from resting or causes severe fatigue and chest-wall pain, talk to your doctor.  He or she may suggest that you take a medicine to reduce the cough. · Do not smoke or allow others to smoke around you. When should you call for help? Call 911 anytime you think you may need emergency care. For example, call if: 
  · You have severe trouble breathing.  
 Call your doctor now or seek immediate medical care if: 
  · You cough up dark brown or bloody mucus (sputum).  
  · You have new or worse trouble breathing.  
  · You are dizzy or lightheaded, or you feel like you may faint.  
 Watch closely for changes in your health, and be sure to contact your doctor if: 
  · You have a new or higher fever.  
  · Your cough or wheezing has not gone away after 2 to 4 weeks.  
  · You are not getting better as expected. Where can you learn more? Go to http://adonay-isaiah.info/. Enter V660 in the search box to learn more about \"Walking Pneumonia: Care Instructions. \" Current as of: September 5, 2018 Content Version: 11.9 © 7363-4339 [employer]. Care instructions adapted under license by [employer] (which disclaims liability or warranty for this information). If you have questions about a medical condition or this instruction, always ask your healthcare professional. [place] [address] any warranty or liability for your use of this information.

## 2019-05-21 NOTE — PROGRESS NOTES
HISTORY OF PRESENT ILLNESS  Liv Roberts is a [de-identified] y.o. female. HPI  Patient is here today for evaluation and treatment of: ER Visit Follow Up/Nausea/Weakness:    ER Visit Follow Up:  PT was seen in ED on Saturday for Pain on the right side ; She had pain on the right chest area. She was eventually diagnosed with CAP. Pt was ruled out for AMI and DVT and blood clot. She was prescribed Zithromax and seems to have gotten better but recently she has had chills and nausea and vomiting . She vomited twice here in the  Office; . She has known COPD and had been more SOB recently; She feels tired. Current Outpatient Medications:     cholecalciferol, VITAMIN D3, (VITAMIN D3) 5,000 unit tab tablet, Take  by mouth daily. , Disp: , Rfl:     simvastatin (ZOCOR) 20 mg tablet, TAKE 1 TABLET EVERY NIGHT, Disp: 90 Tab, Rfl: 0    tiotropium (SPIRIVA WITH HANDIHALER) 18 mcg inhalation capsule, Take 1 Cap by inhalation daily. , Disp: 30 Cap, Rfl: 11    SHINGRIX, PF, 50 mcg/0.5 mL susr injection, INJECT AS DIRECTED, Disp: , Rfl: 0    FLUAD [id number], 65 YR UP,,PF, syrg injection, ADM 0.5ML IM UTD, Disp: , Rfl: 0    budesonide-formoterol (SYMBICORT) 160-4.5 mcg/actuation HFA inhaler, Take 2 Puffs by inhalation two (2) times a day., Disp: 1 Inhaler, Rfl: 5    albuterol (PROVENTIL HFA, VENTOLIN HFA, PROAIR HFA) 90 mcg/actuation inhaler, Take 1 Puff by inhalation every four (4) hours as needed for Wheezing., Disp: 1 Inhaler, Rfl: 3    MULTIVIT-MIN/FA/LUTEIN/ZEAXANT (ICAPS MV PO), Take 1 Tab by mouth four (4) times daily. , Disp: , Rfl:     glucosamine 1,000 mg Tab, Take 1,000 mg by mouth daily. , Disp: , Rfl:     calcium-vitamin D (CALCIUM 500+D) 500 mg(1,250mg) -200 unit per tablet, Take 1 Tab by mouth two (2) times daily (with meals). With magnesium , Disp: , Rfl:     fish oil-dha-epa (FISH OIL) 1,200-144-216 mg Cap, Take 2 Caps by mouth daily. , Disp: , Rfl:     STRONTIUM GLUCONATE-B6-B12-FA PO, Take 1 Tab by mouth two (2) times a day., Disp: , Rfl:       PMH,  Meds, Allergies, Family History, Social history reviewed    Review of Systems   Constitutional: Positive for chills and fever. Gastrointestinal: Positive for nausea and vomiting. Physical Exam   Constitutional: She appears well-developed and well-nourished. No distress. Tired appearing   Cardiovascular: Normal rate and regular rhythm. Exam reveals no gallop and no friction rub. No murmur heard. Pulmonary/Chest: Breath sounds normal. No respiratory distress. She has no wheezes. She has no rales. Musculoskeletal: She exhibits no edema. Nursing note and vitals reviewed. Visit Vitals  /60   Pulse 90   Temp (!) 101.6 °F (38.7 °C) (Oral)   Resp 20   Ht 5' 5\" (1.651 m)   Wt 141 lb (64 kg)   SpO2 93%   BMI 23.46 kg/m²       ASSESSMENT and PLAN    ICD-10-CM ICD-9-CM    1. Community acquired pneumonia of right middle lobe of lung ([facility] [address] J18.1 481 XR CHEST PA LAT   2. Non-intractable cyclical vomiting with nausea G43. A0 536.2 promethazine (PHENERGAN) 25 mg tablet       As above  Finish abx  Phenergan as ordered  Rest, bland diet, plenty of liquids, consider dago  Follow-up and Dispositions    · Return in about 2 weeks (around 6/4/2019) for pneumonia and nausea /vomiting. An After Visit Summary was printed and given to the patient.   This has been fully explained to the patient, who indicates understanding.'

## 2019-05-22 ENCOUNTER — TELEPHONE (OUTPATIENT)
Dept: FAMILY MEDICINE CLINIC | Age: 79
End: 2019-05-22

## 2019-05-22 DIAGNOSIS — E78.00 PURE HYPERCHOLESTEROLEMIA: ICD-10-CM

## 2019-05-22 RX ORDER — AMLODIPINE BESYLATE 5 MG/1
5 TABLET ORAL DAILY
COMMUNITY
End: 2019-05-22 | Stop reason: SDUPTHER

## 2019-05-22 RX ORDER — SIMVASTATIN 20 MG/1
TABLET, FILM COATED ORAL
Qty: 90 TAB | Refills: 1 | Status: SHIPPED | OUTPATIENT
Start: 2019-05-22 | End: 2019-11-20 | Stop reason: SDUPTHER

## 2019-05-22 RX ORDER — AMLODIPINE BESYLATE 5 MG/1
5 TABLET ORAL DAILY
Qty: 90 TAB | Refills: 1 | Status: SHIPPED | OUTPATIENT
Start: 2019-05-22 | End: 2020-02-29

## 2019-05-22 NOTE — TELEPHONE ENCOUNTER
Spoke with patient to inquire about patient's status for Dr. Roro Gonzales. Patient stated that patient has improved, even though, patient is still having a little fatigue. Patient stated that patient was able to eat a little and have a bowl of soup. Informed that Dr. Roro Gonzales did not discontinue amlodipine so this medication has been placed back on medication list. Patient requested medication refills on amlodipine and simvastatin. Will send refills to pharmacy. Patient verbalized understanding.

## 2019-05-24 ENCOUNTER — TELEPHONE (OUTPATIENT)
Dept: FAMILY MEDICINE CLINIC | Age: 79
End: 2019-05-24

## 2019-05-24 NOTE — TELEPHONE ENCOUNTER
Patient called the office today. States she completed Zpack about 2 days ago. States temperature has been spiking. Highest: 102. States she been taking tylenol for this. States she is experiencing the following: chills/sweats/fatigue. Noticed increased SOB. Denies any nausea or vomiting. Patient comments she is going to go to the emergency room for evaluation.

## 2019-06-05 ENCOUNTER — OFFICE VISIT (OUTPATIENT)
Dept: FAMILY MEDICINE CLINIC | Age: 79
End: 2019-06-05

## 2019-06-05 VITALS
WEIGHT: 138.4 LBS | RESPIRATION RATE: 18 BRPM | DIASTOLIC BLOOD PRESSURE: 65 MMHG | SYSTOLIC BLOOD PRESSURE: 137 MMHG | HEART RATE: 76 BPM | HEIGHT: 65 IN | OXYGEN SATURATION: 98 % | TEMPERATURE: 98 F | BODY MASS INDEX: 23.06 KG/M2

## 2019-06-05 DIAGNOSIS — R11.15 NON-INTRACTABLE CYCLICAL VOMITING WITH NAUSEA: ICD-10-CM

## 2019-06-05 DIAGNOSIS — J18.9 COMMUNITY ACQUIRED PNEUMONIA OF RIGHT MIDDLE LOBE OF LUNG: Primary | ICD-10-CM

## 2019-06-05 NOTE — PROGRESS NOTES
Patient is here today for follow up treatment of pneumonia/nausea and vomiting. 1. Have you been to the ER, urgent care clinic since your last visit? Hospitalized since your last visit? Went [facility]'s ER for fever 5/24/19    2. Have you seen or consulted any other health care providers outside of the [address] [place] [address] since your last visit? Include any pap smears or colon screening.  Dr Rafael Graysonget pulmonologist

## 2019-06-05 NOTE — PROGRESS NOTES
HISTORY OF PRESENT ILLNESS  Chava Foote is a [de-identified] y.o. female. HPI  Patient is here today for evaluation and treatment of: Pneumonia/Nausea/Vomiting follow up:    Pneumonia/Nausea/Vomiting Follow Up: Pt is feeling better. She has COPD. She is no longer having wheeze or SOB. She is not vomiting any longer. Her breathing is stable; she does not feel SOB      Current Outpatient Medications:     Colostrum/selen/gr t/Mush Cmb1 (COLOSTRUM-SE-GRTEA-MUSHROOMCMB PO), Take  by mouth., Disp: , Rfl:     amLODIPine (NORVASC) 5 mg tablet, Take 1 Tab by mouth daily. , Disp: 90 Tab, Rfl: 1    simvastatin (ZOCOR) 20 mg tablet, TAKE 1 TABLET BY MOUTH EVERY NIGHT., Disp: 90 Tab, Rfl: 1    cholecalciferol, VITAMIN D3, (VITAMIN D3) 5,000 unit tab tablet, Take  by mouth daily. , Disp: , Rfl:     tiotropium (SPIRIVA WITH HANDIHALER) 18 mcg inhalation capsule, Take 1 Cap by inhalation daily. , Disp: 30 Cap, Rfl: 11    SHINGRIX, PF, 50 mcg/0.5 mL susr injection, INJECT AS DIRECTED, Disp: , Rfl: 0    FLUAD [id number], 65 YR UP,,PF, syrg injection, ADM 0.5ML IM UTD, Disp: , Rfl: 0    budesonide-formoterol (SYMBICORT) 160-4.5 mcg/actuation HFA inhaler, Take 2 Puffs by inhalation two (2) times a day., Disp: 1 Inhaler, Rfl: 5    albuterol (PROVENTIL HFA, VENTOLIN HFA, PROAIR HFA) 90 mcg/actuation inhaler, Take 1 Puff by inhalation every four (4) hours as needed for Wheezing., Disp: 1 Inhaler, Rfl: 3    MULTIVIT-MIN/FA/LUTEIN/ZEAXANT (ICAPS MV PO), Take 1 Tab by mouth four (4) times daily. , Disp: , Rfl:     glucosamine 1,000 mg Tab, Take 1,000 mg by mouth daily. , Disp: , Rfl:     calcium-vitamin D (CALCIUM 500+D) 500 mg(1,250mg) -200 unit per tablet, Take 1 Tab by mouth two (2) times daily (with meals). With magnesium , Disp: , Rfl:     fish oil-dha-epa (FISH OIL) 1,200-144-216 mg Cap, Take 2 Caps by mouth daily. , Disp: , Rfl:     STRONTIUM GLUCONATE-B6-B12-FA PO, Take 1 Tab by mouth two (2) times a day., Disp: , Rfl: PMH,  Meds, Allergies, Family History, Social history reviewed      Review of Systems   Constitutional: Negative for chills and fever. Respiratory: Negative for shortness of breath and wheezing. Cardiovascular: Negative for chest pain and palpitations. Physical Exam   Visit Vitals  /65 (BP 1 Location: Left arm, BP Patient Position: Sitting)   Pulse 76   Temp 98 °F (36.7 °C) (Oral)   Resp 18   Ht 5' 5\" (1.651 m)   Wt 138 lb 6.4 oz (62.8 kg)   SpO2 98%   BMI 23.03 kg/m²     General appearance: alert, cooperative, no distress, appears stated age  Neck: supple, symmetrical, trachea midline, no adenopathy, thyroid: not enlarged, symmetric, no tenderness/mass/nodules, no carotid bruit and no JVD  Lungs: clear to auscultation bilaterally  Heart: regular rate and rhythm, S1, S2 normal, no murmur, click, rub or gallop  Abdomen: soft, non-tender. Bowel sounds normal. No masses,  no organomegaly  Extremities: extremities normal, atraumatic, no cyanosis or edema      ASSESSMENT and PLAN    ICD-10-CM ICD-9-CM    1. Community acquired pneumonia of right middle lobe of lung ([facility] [address] J18.1 481    2. Non-intractable cyclical vomiting with nausea G43. A0 536.2        As above, Pt is better; Follow up with Pulmonary as scheduled in July; Pt prefers to defer o pulmonary whether to get the second xray or not. treatment plan as listed below  Orders Placed This Encounter    Colostrum/selen/gr t/Mush Cmb1 (COLOSTRUM-SE-GRTEA-MUSHROOMCMB PO)- med rec   meds reconciled  Follow-up and Dispositions    · Return in about 3 months (around 9/5/2019) for medicare well . An After Visit Summary was printed and given to the patient. This has been fully explained to the patient, who indicates understanding.

## 2019-06-05 NOTE — PATIENT INSTRUCTIONS
Pneumonia: Care Instructions  Your Care Instructions    Pneumonia is an infection of the lungs. Most cases are caused by infections from bacteria or viruses. Pneumonia may be mild or very severe. If it is caused by bacteria, you will be treated with antibiotics. It may take a few weeks to a few months to recover fully from pneumonia, depending on how sick you were and whether your overall health is good. Follow-up care is a key part of your treatment and safety. Be sure to make and go to all appointments, and call your doctor if you are having problems. It's also a good idea to know your test results and keep a list of the medicines you take. How can you care for yourself at home? · Take your antibiotics exactly as directed. Do not stop taking the medicine just because you are feeling better. You need to take the full course of antibiotics. · Take your medicines exactly as prescribed. Call your doctor if you think you are having a problem with your medicine. · Get plenty of rest and sleep. You may feel weak and tired for a while, but your energy level will improve with time. · To prevent dehydration, drink plenty of fluids, enough so that your urine is light yellow or clear like water. Choose water and other caffeine-free clear liquids until you feel better. If you have kidney, heart, or liver disease and have to limit fluids, talk with your doctor before you increase the amount of fluids you drink. · Take care of your cough so you can rest. A cough that brings up mucus from your lungs is common with pneumonia. It is one way your body gets rid of the infection. But if coughing keeps you from resting or causes severe fatigue and chest-wall pain, talk to your doctor. He or she may suggest that you take a medicine to reduce the cough. · Use a vaporizer or humidifier to add moisture to your bedroom. Follow the directions for cleaning the machine. · Do not smoke or allow others to smoke around you.  Smoke will make your cough last longer. If you need help quitting, talk to your doctor about stop-smoking programs and medicines. These can increase your chances of quitting for good. · Take an over-the-counter pain medicine, such as acetaminophen (Tylenol), ibuprofen (Advil, Motrin), or naproxen (Aleve). Read and follow all instructions on the label. · Do not take two or more pain medicines at the same time unless the doctor told you to. Many pain medicines have acetaminophen, which is Tylenol. Too much acetaminophen (Tylenol) can be harmful. · If you were given a spirometer to measure how well your lungs are working, use it as instructed. This can help your doctor tell how your recovery is going. · To prevent pneumonia in the future, talk to your doctor about getting a flu vaccine (once a year) and a pneumococcal vaccine (one time only for most people). When should you call for help? Call 911 anytime you think you may need emergency care. For example, call if:    · You have severe trouble breathing.    Call your doctor now or seek immediate medical care if:    · You cough up dark brown or bloody mucus (sputum).     · You have new or worse trouble breathing.     · You are dizzy or lightheaded, or you feel like you may faint.    Watch closely for changes in your health, and be sure to contact your doctor if:    · You have a new or higher fever.     · You are coughing more deeply or more often.     · You are not getting better after 2 days (48 hours).     · You do not get better as expected. Where can you learn more? Go to http://adonay-isaiah.info/. Enter [id number] in the search box to learn more about \"Pneumonia: Care Instructions. \"  Current as of: September 5, 2018  Content Version: 11.9  © 3806-8961 [employer]. Care instructions adapted under license by [employer] (which disclaims liability or warranty for this information).  If you have questions about a medical condition or this instruction, always ask your healthcare professional. [place] [address] any warranty or liability for your use of this information.

## 2019-07-02 DIAGNOSIS — J45.909 RAD (REACTIVE AIRWAY DISEASE), UNSPECIFIED ASTHMA SEVERITY, UNCOMPLICATED: ICD-10-CM

## 2019-08-08 ENCOUNTER — OFFICE VISIT (OUTPATIENT)
Dept: FAMILY MEDICINE CLINIC | Age: 79
End: 2019-08-08

## 2019-08-08 VITALS
DIASTOLIC BLOOD PRESSURE: 60 MMHG | RESPIRATION RATE: 20 BRPM | HEART RATE: 71 BPM | WEIGHT: 137.4 LBS | SYSTOLIC BLOOD PRESSURE: 130 MMHG | HEIGHT: 65 IN | BODY MASS INDEX: 22.89 KG/M2 | OXYGEN SATURATION: 100 % | TEMPERATURE: 98 F

## 2019-08-08 DIAGNOSIS — R07.89 OTHER CHEST PAIN: Primary | ICD-10-CM

## 2019-08-08 NOTE — PROGRESS NOTES
HISTORY OF PRESENT ILLNESS  Dereck Cormier is a [de-identified] y.o. female. HPI  Patient is here today for evaluation and treatment of: ER Visit Follow Up:    ER Visit Follow Up:    Pt was seen at [facility] on August 4 th after she developed Chest pain at 12:00 am. She was discharged from the ED after she ruled out for ACS. Pain was a 7/10 in intensity. She had no wheeze. She had no SOB. She was advised to stay in the hospital  but she declined this. She will need an outpatient Stress test.  She has had no further complaints of CP and has no CP at this current times. She does have COPD. She has been followed by pulmonary. Her maintenace inhaler has recently changed to stioloto      Current Outpatient Medications:     tiotropium-olodaterol (STIOLTO RESPIMAT) 2.5-2.5 mcg/actuation inhaler, Take 2 Puffs by inhalation. , Disp: , Rfl:     Colostrum/selen/gr t/Mush Cmb1 (COLOSTRUM-SE-GRTEA-MUSHROOMCMB PO), Take  by mouth., Disp: , Rfl:     amLODIPine (NORVASC) 5 mg tablet, Take 1 Tab by mouth daily. , Disp: 90 Tab, Rfl: 1    simvastatin (ZOCOR) 20 mg tablet, TAKE 1 TABLET BY MOUTH EVERY NIGHT., Disp: 90 Tab, Rfl: 1    cholecalciferol, VITAMIN D3, (VITAMIN D3) 5,000 unit tab tablet, Take  by mouth daily. , Disp: , Rfl:     SHINGRIX, PF, 50 mcg/0.5 mL susr injection, INJECT AS DIRECTED, Disp: , Rfl: 0    FLUAD [id number], 65 YR UP,,PF, syrg injection, ADM 0.5ML IM UTD, Disp: , Rfl: 0    albuterol (PROVENTIL HFA, VENTOLIN HFA, PROAIR HFA) 90 mcg/actuation inhaler, Take 1 Puff by inhalation every four (4) hours as needed for Wheezing., Disp: 1 Inhaler, Rfl: 3    MULTIVIT-MIN/FA/LUTEIN/ZEAXANT (ICAPS MV PO), Take 1 Tab by mouth four (4) times daily. , Disp: , Rfl:     glucosamine 1,000 mg Tab, Take 1,000 mg by mouth daily. , Disp: , Rfl:     calcium-vitamin D (CALCIUM 500+D) 500 mg(1,250mg) -200 unit per tablet, Take 1 Tab by mouth two (2) times daily (with meals).  With magnesium , Disp: , Rfl:     fish oil-dha-epa (FISH OIL) 1,200-144-216 mg Cap, Take 2 Caps by mouth daily. , Disp: , Rfl:       PMH,  Meds, Allergies, Family History, Social history reviewed           Review of Systems   Constitutional: Negative for chills and fever. Cardiovascular: Negative for chest pain (none now) and palpitations. Physical Exam   Visit Vitals  /60   Pulse 71   Temp 98 °F (36.7 °C) (Oral)   Resp 20   Ht 5' 5\" (1.651 m)   Wt 137 lb 6.4 oz (62.3 kg)   SpO2 100%   BMI 22.86 kg/m²     General appearance: alert, cooperative, no distress, appears stated age  Neck: supple, symmetrical, trachea midline, no adenopathy, thyroid: not enlarged, symmetric, no tenderness/mass/nodules, no carotid bruit and no JVD  Lungs: clear to auscultation bilaterally  Heart: regular rate and rhythm, S1, S2 normal, no murmur, click, rub or gallop  Extremities: extremities normal, atraumatic, no cyanosis or edema    . Lab Results   Component Value Date/Time    Cholesterol, total 172 12/03/2018 07:33 AM    HDL Cholesterol 81 12/03/2018 07:33 AM    LDL, calculated 73 12/03/2018 07:33 AM    VLDL, calculated 18 12/03/2018 07:33 AM    Triglyceride 90 12/03/2018 07:33 AM    CHOL/HDL Ratio 3.1 11/02/2010 08:00 AM     Lab Results   Component Value Date/Time    Sodium 145 (H) 12/03/2018 07:33 AM    Potassium 4.4 12/03/2018 07:33 AM    Chloride 105 12/03/2018 07:33 AM    CO2 26 12/03/2018 07:33 AM    Anion gap 4 06/16/2015 10:15 AM    Glucose 89 12/03/2018 07:33 AM    BUN 14 12/03/2018 07:33 AM    Creatinine 0.81 12/03/2018 07:33 AM    BUN/Creatinine ratio 17 12/03/2018 07:33 AM    GFR est AA 79 12/03/2018 07:33 AM    GFR est non-AA 69 12/03/2018 07:33 AM    Calcium 9.1 12/03/2018 07:33 AM     .  ASSESSMENT and PLAN    ICD-10-CM ICD-9-CM    1.  Other chest pain R07.89 786.59 NUCLEAR CARDIAC STRESS TEST       As above, no ACS found in ED but further work up is needed;    treatment plan as listed below  Orders Placed This Encounter    tiotropium-olodaterol ([address] RESPIMAT) 2.5-2.5 mcg/actuation inhaler     Orders Placed This Encounter    tiotropium-olodaterol (STIOLTO RESPIMAT) 2.5-2.5 mcg/actuation inhaler     Stress test ordered  Follow-up and Dispositions    · Return sept as scheduled. An After Visit Summary was printed and given to the patient. This has been fully explained to the patient, who indicates understanding.

## 2019-08-08 NOTE — PROGRESS NOTES
Patient here for ER Visit Follow Up.        1. Have you been to the ER, urgent care clinic since your last visit? Hospitalized since your last visit? went to [facility]'s ER for chest pain 8/4/2019    2. Have you seen or consulted any other health care providers outside of the [address] [place] [address] since your last visit? Include any pap smears or colon screening.  Dr Alphonsa Curling July 2019

## 2019-08-08 NOTE — PATIENT INSTRUCTIONS

## 2019-08-15 ENCOUNTER — HOSPITAL ENCOUNTER (OUTPATIENT)
Dept: NON INVASIVE DIAGNOSTICS | Age: 79
Discharge: HOME | End: 2019-08-15
Attending: FAMILY MEDICINE

## 2019-08-21 ENCOUNTER — TELEPHONE (OUTPATIENT)
Dept: FAMILY MEDICINE CLINIC | Age: 79
End: 2019-08-21

## 2019-08-22 NOTE — TELEPHONE ENCOUNTER
Spoke with patient to inform that test has been approved. Informed that order was correct, but test was pending for review and this process took several day. Informed that authorization team should reach out to patient to reschedule the test within a couple of days. Patient verbalized understanding.

## 2019-08-22 NOTE — TELEPHONE ENCOUNTER
Spoke with [employer] at [facility] to inquire if any information is needed from primary care provider for authorization for nuclear cardiac stress test? [employer] verbalized understanding, stated no, test was authorized yesterday, and gave authorization code [id number].

## 2019-08-22 NOTE — TELEPHONE ENCOUNTER
Spoke with Bethanie at [address] [place] [address] to inquire when patient is contacted to reschedule appointment since authorization was approved yesterday and previous appointment was cancelled. Bethanie verbalized understanding and stated authorization department will contact patient to reschedule.

## 2019-08-27 NOTE — TELEPHONE ENCOUNTER
[facility] to inquire why the provider needs to order the test again due to authorization is still pending. Informed that our office has the authorization number from [facility]. Smita verbalized understanding and scheduled patient's appointment. Appointment information:     Appointment date: 08/29/2019  Appointment time: 8:00 AM  Arrival time: 7:45 AM  [facility] [address]  Please wear comfortable clothes and shoes. Please do not eat or drink after midnight. Please be prepared for a 4 hour test with intermittent scanning.

## 2019-09-04 ENCOUNTER — OFFICE VISIT (OUTPATIENT)
Dept: FAMILY MEDICINE CLINIC | Age: 79
End: 2019-09-04

## 2019-09-04 VITALS
RESPIRATION RATE: 18 BRPM | OXYGEN SATURATION: 97 % | HEIGHT: 65 IN | SYSTOLIC BLOOD PRESSURE: 124 MMHG | TEMPERATURE: 98 F | WEIGHT: 138 LBS | DIASTOLIC BLOOD PRESSURE: 72 MMHG | BODY MASS INDEX: 22.99 KG/M2 | HEART RATE: 75 BPM

## 2019-09-04 DIAGNOSIS — R73.9 ELEVATED BLOOD SUGAR: ICD-10-CM

## 2019-09-04 DIAGNOSIS — I10 ESSENTIAL HYPERTENSION: ICD-10-CM

## 2019-09-04 DIAGNOSIS — Z00.00 MEDICARE ANNUAL WELLNESS VISIT, SUBSEQUENT: Primary | ICD-10-CM

## 2019-09-04 DIAGNOSIS — E55.9 HYPOVITAMINOSIS D: ICD-10-CM

## 2019-09-04 DIAGNOSIS — Z23 ENCOUNTER FOR IMMUNIZATION: ICD-10-CM

## 2019-09-04 NOTE — PROGRESS NOTES
Patient is here for medicare wellness check. 1. Have you been to the ER, urgent care clinic since your last visit? Hospitalized since your last visit? went to [facility] for left chest pain. 2. Have you seen or consulted any other health care providers outside of the [address] [place] [address] since your last visit? Include any pap smears or colon screening.  No

## 2019-09-04 NOTE — PROGRESS NOTES
This is the Subsequent Medicare Annual Wellness Exam, performed 12 months or more after the Initial AWV or the last Subsequent AWV    I have reviewed the patient's medical history in detail and updated the computerized patient record. She had no further CP; Will have a stress test tomorrow. She is also under pulmonary care. Had a sed rate of 57. Advanced care plan is in place    She is in need of blood work being done. History     Past Medical History:   Diagnosis Date    Allergic rhinitis     AR (allergic rhinitis) 11/16/2011    Cataract     Chronic sinusitis     Hyperlipidaemia     Hyperlipidemia 11/16/2011    Hypertension     Mild Diastolic    Low ferritin level     Osteopenia 11/16/2011    Osteoporosis     Pneumonia     Vitamin D deficiencies     Mild      Past Surgical History:   Procedure Laterality Date    ABDOMEN SURGERY PROC UNLISTED  1964    Hysterectomy    ENDOSCOPY, COLON, DIAGNOSTIC  12/4/01    PT DECLINED     Current Outpatient Medications   Medication Sig Dispense Refill    tiotropium-olodaterol (STIOLTO RESPIMAT) 2.5-2.5 mcg/actuation inhaler Take 2 Puffs by inhalation.  Colostrum/selen/gr t/Mush Cmb1 (COLOSTRUM-SE-GRTEA-MUSHROOMCMB PO) Take  by mouth.  amLODIPine (NORVASC) 5 mg tablet Take 1 Tab by mouth daily. 90 Tab 1    simvastatin (ZOCOR) 20 mg tablet TAKE 1 TABLET BY MOUTH EVERY NIGHT. 90 Tab 1    cholecalciferol, VITAMIN D3, (VITAMIN D3) 5,000 unit tab tablet Take  by mouth daily.  SHINGRIX, PF, 50 mcg/0.5 mL susr injection INJECT AS DIRECTED  0    albuterol (PROVENTIL HFA, VENTOLIN HFA, PROAIR HFA) 90 mcg/actuation inhaler Take 1 Puff by inhalation every four (4) hours as needed for Wheezing. 1 Inhaler 3    MULTIVIT-MIN/FA/LUTEIN/ZEAXANT (ICAPS MV PO) Take 1 Tab by mouth four (4) times daily.  glucosamine 1,000 mg Tab Take 1,000 mg by mouth daily.       calcium-vitamin D (CALCIUM 500+D) 500 mg(1,250mg) -200 unit per tablet Take 1 Tab by mouth two (2) times daily (with meals). With magnesium       fish oil-dha-epa (FISH OIL) 1,200-144-216 mg Cap Take 2 Caps by mouth daily.  FLUAD 3648-4129, 72 YR UP,,PF, syrg injection ADM 0.5ML IM UTD  0     Allergies   Allergen Reactions    Augmentin [Amoxicillin-Pot Clavulanate] Diarrhea     severe    Sulfamerazine Diarrhea     Family History   Problem Relation Age of Onset    Hypertension Mother     Other Mother         [facility] Cancer Mother         Possibly Breast    COPD Father     Cancer Father         Some Form     Social History     Tobacco Use    Smoking status: Never Smoker    Smokeless tobacco: Never Used   Substance Use Topics    Alcohol use: No     Patient Active Problem List   Diagnosis Code    HTN (hypertension) I10    Osteopenia M85.80    AR (allergic rhinitis) J30.9    Hyperlipidemia E78.5    RAD (reactive airway disease) J45.909    Low serum ferritin level R79.0    Chest pressure R07.89    Fatigue R53.83    Cough R05    DUBON (dyspnea on exertion) R06.09    COPD (chronic obstructive pulmonary disease) ([facility] [address] J44.9    Advance care planning Z71.89    Need for pneumococcal vaccination Z23    Pulmonary nodules R91.8    Encounter for long-term (current) use of medications Z79.899    Medicare annual wellness visit, subsequent Z00.00    Screening for depression Z13.31    Hypovitaminosis D E55.9    Prediabetes R73.03       Depression Risk Factor Screening:     3 most recent PHQ Screens 9/4/2019   Little interest or pleasure in doing things Several days   Feeling down, depressed, irritable, or hopeless Several days   Total Score PHQ 2 2     Alcohol Risk Factor Screening: You do not drink alcohol or very rarely. Functional Ability and Level of Safety:   Hearing Loss  Hearing is good. Activities of Daily Living  The home contains: handrails  Patient does total self care  Grab bar in shower  Watches scatter rugs;     Fall Risk  Fall Risk Assessment, last 12 mths 9/4/2019 Able to walk? Yes   Fall in past 12 months? No       Abuse Screen  Patient is not abused    Cognitive Screening   Evaluation of Cognitive Function:  Has your family/caregiver stated any concerns about your memory: no  Normal    Patient Care Team   Patient Care Team:  Meagan Mena MD as PCP - General (Family Practice)  Cecile Borges MD (Pulmonary Disease)     PE:  Visit Vitals  /72   Pulse 75   Temp 98 °F (36.7 °C) (Oral)   Resp 18   Ht 5' 5\" (1.651 m)   Wt 138 lb (62.6 kg)   SpO2 97%   BMI 22.96 kg/m²     General appearance: alert, cooperative, no distress, appears stated age  Neck: supple, symmetrical, trachea midline, no adenopathy, thyroid: not enlarged, symmetric, no tenderness/mass/nodules, no carotid bruit and no JVD  Lungs: clear to auscultation bilaterally  Heart: regular rate and rhythm, S1, S2 normal, no murmur, click, rub or gallop  Extremities: extremities normal, atraumatic, no cyanosis or edema      Assessment/Plan   Education and counseling provided:  Are appropriate based on today's review and evaluation  End-of-Life planning (with patient's consent)    Diagnoses and all orders for this visit:    1. Medicare annual wellness visit, subsequent    2. Hypovitaminosis D- for lab orders  -     VITAMIN D, 25 HYDROXY; Future    3. Essential hypertension- for lab orders  -     LIPID PANEL; Future  -     METABOLIC PANEL, BASIC; Future    4. Encounter for immunization  -     INFLUENZA VACCINE INACTIVATED (IIV), SUBUNIT, ADJUVANTED, IM    5. Elevated blood sugar- for lab orders  -     HEMOGLOBIN A1C WITH EAG; Future        There are no preventive care reminders to display for this patient.      As above,   above all stable unless otherwise noted   treatment plan as listed below  Orders Placed This Encounter    Influenza Vaccine Inactivated (IIV) (FLUAD), Subunit, Adjuvanted, IM ([id number])    LIPID PANEL    METABOLIC PANEL, BASIC    HEMOGLOBIN A1C WITH EAG    VITAMIN D, 25 HYDROXY Follow-up and Dispositions    · Return in about 4 months (around 1/4/2020), or htn/chol. An After Visit Summary was printed and given to the patient. This has been fully explained to the patient, who indicates understanding.

## 2019-09-04 NOTE — PATIENT INSTRUCTIONS
Medicare Wellness Visit, Female     The best way to live healthy is to have a lifestyle where you eat a well-balanced diet, exercise regularly, limit alcohol use, and quit all forms of tobacco/nicotine, if applicable. Regular preventive services are another way to keep healthy. Preventive services (vaccines, screening tests, monitoring & exams) can help personalize your care plan, which helps you manage your own care. Screening tests can find health problems at the earliest stages, when they are easiest to treat. [facility] follows the current, evidence-based guidelines published by the [facility] Dustin ([facility]) when recommending preventive services for our patients. Because we follow these guidelines, sometimes recommendations change over time as research supports it. (For example, mammograms used to be recommended annually. Even though Medicare will still pay for an annual mammogram, the newer guidelines recommend a mammogram every two years for women of average risk.)  Of course, you and your doctor may decide to screen more often for some diseases, based on your risk and your health status. Preventive services for you include:  - Medicare offers their members a free annual wellness visit, which is time for you and your primary care provider to discuss and plan for your preventive service needs. Take advantage of this benefit every year!  -All adults over the age of 72 should receive the recommended pneumonia vaccines. Current USPSTF guidelines recommend a series of two vaccines for the best pneumonia protection.   -All adults should have a flu vaccine yearly and a tetanus vaccine every 10 years. All adults age 61 and older should receive a shingles vaccine once in their lifetime.    -A bone mass density test is recommended when a woman turns 65 to screen for osteoporosis. This test is only recommended one time, as a screening.  Some providers will use this same test as a disease monitoring tool if you already have osteoporosis. -All adults age 38-68 who are overweight should have a diabetes screening test once every three years.   -Other screening tests and preventive services for persons with diabetes include: an eye exam to screen for diabetic retinopathy, a kidney function test, a foot exam, and stricter control over your cholesterol.   -Cardiovascular screening for adults with routine risk involves an electrocardiogram (ECG) at intervals determined by your doctor.   -Colorectal cancer screenings should be done for adults age 54-65 with no increased risk factors for colorectal cancer. There are a number of acceptable methods of screening for this type of cancer. Each test has its own benefits and drawbacks. Discuss with your doctor what is most appropriate for you during your annual wellness visit. The different tests include: colonoscopy (considered the best screening method), a fecal occult blood test, a fecal DNA test, and sigmoidoscopy. -Breast cancer screenings are recommended every other year for women of normal risk, age 54-69.  -Cervical cancer screenings for women over age 72 are only recommended with certain risk factors.   -All adults born between [facility] should be screened once for Hepatitis C. Here is a list of your current Health Maintenance items (your personalized list of preventive services) with a due date: There are no preventive care reminders to display for this patient.

## 2019-09-05 ENCOUNTER — HOSPITAL ENCOUNTER (OUTPATIENT)
Dept: NON INVASIVE DIAGNOSTICS | Age: 79
Discharge: HOME | End: 2019-09-05
Attending: FAMILY MEDICINE
Payer: MEDICARE

## 2019-09-05 VITALS
DIASTOLIC BLOOD PRESSURE: 60 MMHG | SYSTOLIC BLOOD PRESSURE: 140 MMHG | BODY MASS INDEX: 22.99 KG/M2 | WEIGHT: 138 LBS | HEIGHT: 65 IN

## 2019-09-05 DIAGNOSIS — R07.89 OTHER CHEST PAIN: ICD-10-CM

## 2019-09-05 LAB
STRESS BASELINE DIAS BP: 60 MMHG
STRESS BASELINE HR: 67 BPM
STRESS BASELINE SYS BP: 140 MMHG
STRESS ESTIMATED WORKLOAD: 1.5 METS
STRESS EXERCISE DUR MIN: NORMAL
STRESS PEAK DIAS BP: 80 MMHG
STRESS PEAK SYS BP: 158 MMHG
STRESS PERCENT HR ACHIEVED: 74 %
STRESS POST PEAK HR: 103 BPM
STRESS RATE PRESSURE PRODUCT: NORMAL BPM*MMHG
STRESS ST DEPRESSION: 0 MM
STRESS ST ELEVATION: 0 MM
STRESS TARGET HR: 140 BPM

## 2019-09-05 PROCEDURE — 74011250636 HC RX REV CODE- 250/636: Performed by: FAMILY MEDICINE

## 2019-09-05 PROCEDURE — 93017 CV STRESS TEST TRACING ONLY: CPT

## 2019-09-05 RX ORDER — SODIUM CHLORIDE 0.9 % (FLUSH) 0.9 %
10 SYRINGE (ML) INJECTION AS NEEDED
Status: COMPLETED | OUTPATIENT
Start: 2019-09-05 | End: 2019-09-05

## 2019-09-05 RX ORDER — SODIUM CHLORIDE 9 MG/ML
100 INJECTION, SOLUTION INTRAVENOUS ONCE
Status: COMPLETED | OUTPATIENT
Start: 2019-09-05 | End: 2019-09-05

## 2019-09-05 RX ADMIN — Medication 10 ML: at 07:05

## 2019-09-05 RX ADMIN — REGADENOSON 0.4 MG: 0.08 INJECTION, SOLUTION INTRAVENOUS at 08:50

## 2019-09-05 RX ADMIN — SODIUM CHLORIDE 100 ML/HR: 900 INJECTION, SOLUTION INTRAVENOUS at 08:50

## 2019-09-05 NOTE — PROGRESS NOTES
Patient was injected with 97.8 millicuries 55UPH Sestamibi on 9/5/19 at 0705. Patient was injected with 39.1 millicuries 02ALX Sestamibi on 9/5/19 at 0850. Patient's armbands were removed and placed in shred-it box.     Patient had a Nuclear Lexiscan Stress Test.

## 2019-09-17 LAB
25(OH)D3+25(OH)D2 SERPL-MCNC: 54.4 NG/ML (ref 30–100)
BUN SERPL-MCNC: 10 MG/DL (ref 8–27)
BUN/CREAT SERPL: 11 (ref 12–28)
CALCIUM SERPL-MCNC: 9.3 MG/DL (ref 8.7–10.3)
CHLORIDE SERPL-SCNC: 101 MMOL/L (ref 96–106)
CHOLEST SERPL-MCNC: 178 MG/DL (ref 100–199)
CO2 SERPL-SCNC: 27 MMOL/L (ref 20–29)
CREAT SERPL-MCNC: 0.88 MG/DL (ref 0.57–1)
EST. AVERAGE GLUCOSE BLD GHB EST-MCNC: 111 MG/DL
GLUCOSE SERPL-MCNC: 84 MG/DL (ref 65–99)
HBA1C MFR BLD: 5.5 % (ref 4.8–5.6)
HDLC SERPL-MCNC: 67 MG/DL
LDLC SERPL CALC-MCNC: 77 MG/DL (ref 0–99)
POTASSIUM SERPL-SCNC: 4.2 MMOL/L (ref 3.5–5.2)
SODIUM SERPL-SCNC: 142 MMOL/L (ref 134–144)
TRIGL SERPL-MCNC: 170 MG/DL (ref 0–149)
VLDLC SERPL CALC-MCNC: 34 MG/DL (ref 5–40)

## 2019-11-20 DIAGNOSIS — E78.00 PURE HYPERCHOLESTEROLEMIA: ICD-10-CM

## 2019-11-22 RX ORDER — SIMVASTATIN 20 MG/1
TABLET, FILM COATED ORAL
Qty: 90 TAB | Refills: 1 | Status: SHIPPED | OUTPATIENT
Start: 2019-11-22 | End: 2020-04-22

## 2020-01-07 ENCOUNTER — OFFICE VISIT (OUTPATIENT)
Dept: FAMILY MEDICINE CLINIC | Age: 80
End: 2020-01-07

## 2020-01-07 VITALS
OXYGEN SATURATION: 98 % | WEIGHT: 144 LBS | DIASTOLIC BLOOD PRESSURE: 70 MMHG | HEIGHT: 65 IN | TEMPERATURE: 97.6 F | BODY MASS INDEX: 23.99 KG/M2 | HEART RATE: 73 BPM | RESPIRATION RATE: 16 BRPM | SYSTOLIC BLOOD PRESSURE: 138 MMHG

## 2020-01-07 DIAGNOSIS — E78.00 PURE HYPERCHOLESTEROLEMIA: ICD-10-CM

## 2020-01-07 DIAGNOSIS — I10 ESSENTIAL HYPERTENSION: Primary | ICD-10-CM

## 2020-01-07 NOTE — PROGRESS NOTES
1. Have you been to the ER, urgent care clinic since your last visit? Hospitalized since your last visit? No    2. Have you seen or consulted any other health care providers outside of the [address] [place] [address] since your last visit? Include any pap smears or colon screening.  Yes Where: [facility] pulmonology - Dr. Jj Jamison

## 2020-01-07 NOTE — PATIENT INSTRUCTIONS
DASH Diet: Care Instructions  Your Care Instructions    The DASH diet is an eating plan that can help lower your blood pressure. DASH stands for Dietary Approaches to Stop Hypertension. Hypertension is high blood pressure. The DASH diet focuses on eating foods that are high in calcium, potassium, and magnesium. These nutrients can lower blood pressure. The foods that are highest in these nutrients are fruits, vegetables, low-fat dairy products, nuts, seeds, and legumes. But taking calcium, potassium, and magnesium supplements instead of eating foods that are high in those nutrients does not have the same effect. The DASH diet also includes whole grains, fish, and poultry. The DASH diet is one of several lifestyle changes your doctor may recommend to lower your high blood pressure. Your doctor may also want you to decrease the amount of sodium in your diet. Lowering sodium while following the DASH diet can lower blood pressure even further than just the DASH diet alone. Follow-up care is a key part of your treatment and safety. Be sure to make and go to all appointments, and call your doctor if you are having problems. It's also a good idea to know your test results and keep a list of the medicines you take. How can you care for yourself at home? Following the DASH diet  · Eat 4 to 5 servings of fruit each day. A serving is 1 medium-sized piece of fruit, ½ cup chopped or canned fruit, 1/4 cup dried fruit, or 4 ounces (½ cup) of fruit juice. Choose fruit more often than fruit juice. · Eat 4 to 5 servings of vegetables each day. A serving is 1 cup of lettuce or raw leafy vegetables, ½ cup of chopped or cooked vegetables, or 4 ounces (½ cup) of vegetable juice. Choose vegetables more often than vegetable juice. · Get 2 to 3 servings of low-fat and fat-free dairy each day. A serving is 8 ounces of milk, 1 cup of yogurt, or 1 ½ ounces of cheese. · Eat 6 to 8 servings of grains each day.  A serving is 1 slice of bread, 1 ounce of dry cereal, or ½ cup of cooked rice, pasta, or cooked cereal. Try to choose whole-grain products as much as possible. · Limit lean meat, poultry, and fish to 2 servings each day. A serving is 3 ounces, about the size of a deck of cards. · Eat 4 to 5 servings of nuts, seeds, and legumes (cooked dried beans, lentils, and split peas) each week. A serving is 1/3 cup of nuts, 2 tablespoons of seeds, or ½ cup of cooked beans or peas. · Limit fats and oils to 2 to 3 servings each day. A serving is 1 teaspoon of vegetable oil or 2 tablespoons of salad dressing. · Limit sweets and added sugars to 5 servings or less a week. A serving is 1 tablespoon jelly or jam, ½ cup sorbet, or 1 cup of lemonade. · Eat less than 2,300 milligrams (mg) of sodium a day. If you limit your sodium to 1,500 mg a day, you can lower your blood pressure even more. Tips for success  · Start small. Do not try to make dramatic changes to your diet all at once. You might feel that you are missing out on your favorite foods and then be more likely to not follow the plan. Make small changes, and stick with them. Once those changes become habit, add a few more changes. · Try some of the following:  ? Make it a goal to eat a fruit or vegetable at every meal and at snacks. This will make it easy to get the recommended amount of fruits and vegetables each day. ? Try yogurt topped with fruit and nuts for a snack or healthy dessert. ? Add lettuce, tomato, cucumber, and onion to sandwiches. ? Combine a ready-made pizza crust with low-fat mozzarella cheese and lots of vegetable toppings. Try using tomatoes, squash, spinach, broccoli, carrots, cauliflower, and onions. ? Have a variety of cut-up vegetables with a low-fat dip as an appetizer instead of chips and dip. ? Sprinkle sunflower seeds or chopped almonds over salads. Or try adding chopped walnuts or almonds to cooked vegetables.   ? Try some vegetarian meals using beans and peas. Add garbanzo or kidney beans to salads. Make burritos and tacos with mashed francis beans or black beans. Where can you learn more? Go to http://adonay-isaiah.info/. Enter O883 in the search box to learn more about \"DASH Diet: Care Instructions. \"  Current as of: April 9, 2019  Content Version: 12.2  © 7863-7185 [employer]. Care instructions adapted under license by [employer] (which disclaims liability or warranty for this information). If you have questions about a medical condition or this instruction, always ask your healthcare professional. [address] any warranty or liability for your use of this information.

## 2020-01-07 NOTE — PROGRESS NOTES
HISTORY OF PRESENT ILLNESS  Violet Joiner is a 78 y.o. female. HPI  Patient is here today for evaluation and treatment of: Hypertension/Cholesterol problem    Hypertension: ;  Her BP is initially elevated. Better at second check. Cholesterol: On zocor; simvastatin. Lab Results   Component Value Date/Time    Cholesterol, total 178 09/16/2019 08:14 AM    HDL Cholesterol 67 09/16/2019 08:14 AM    LDL, calculated 77 09/16/2019 08:14 AM    VLDL, calculated 34 09/16/2019 08:14 AM    Triglyceride 170 (H) 09/16/2019 08:14 AM    CHOL/HDL Ratio 3.1 11/02/2010 08:00 AM       Inquires about sleep apnea; She has been diagnosed with this; Has tried CPAP before. Had difficulty with mask; tried a mouthguard which seems to help. States that her pulmonologist in the past has recommended she have an implanatble device to treat this. She does not want to to have this done. Will see Dr. Regina Meyer [facility] tomorrow)    Current Outpatient Medications:     simvastatin (ZOCOR) 20 mg tablet, TAKE 1 TABLET BY MOUTH EVERY NIGHT., Disp: 90 Tab, Rfl: 1    tiotropium-olodaterol (STIOLTO RESPIMAT) 2.5-2.5 mcg/actuation inhaler, Take 2 Puffs by inhalation daily. , Disp: , Rfl:     Colostrum/selen/gr t/Mush Cmb1 (COLOSTRUM-SE-GRTEA-MUSHROOMCMB PO), Take  by mouth daily. , Disp: , Rfl:     amLODIPine (NORVASC) 5 mg tablet, Take 1 Tab by mouth daily. , Disp: 90 Tab, Rfl: 1    cholecalciferol, VITAMIN D3, (VITAMIN D3) 5,000 unit tab tablet, Take  by mouth daily. , Disp: , Rfl:     albuterol (PROVENTIL HFA, VENTOLIN HFA, PROAIR HFA) 90 mcg/actuation inhaler, Take 1 Puff by inhalation every four (4) hours as needed for Wheezing., Disp: 1 Inhaler, Rfl: 3    MULTIVIT-MIN/FA/LUTEIN/ZEAXANT (ICAPS MV PO), Take 1 Tab by mouth four (4) times daily. , Disp: , Rfl:     glucosamine 1,000 mg Tab, Take 1,000 mg by mouth daily. , Disp: , Rfl:     calcium-vitamin D (CALCIUM 500+D) 500 mg(1,250mg) -200 unit per tablet, Take 1 Tab by mouth two (2) times daily (with meals). With magnesium , Disp: , Rfl:       PMH,  Meds, Allergies, Family History, Social history reviewed    Review of Systems   Constitutional: Negative for chills. Respiratory: Negative for shortness of breath and wheezing. Cardiovascular: Negative for chest pain and palpitations. Physical Exam  Constitutional:       Appearance: Normal appearance. She is not ill-appearing. Cardiovascular:      Rate and Rhythm: Normal rate and regular rhythm. Heart sounds: No murmur. No friction rub. No gallop. Neurological:      Mental Status: She is alert. Visit Vitals  /70   Pulse 73   Temp 97.6 °F (36.4 °C) (Oral)   Resp 16   Ht 5' 5\" (1.651 m)   Wt 144 lb (65.3 kg)   SpO2 98%   BMI 23.96 kg/m²         ASSESSMENT and PLAN    ICD-10-CM ICD-9-CM    1. Essential hypertension T58 947.2 METABOLIC PANEL, BASIC   2. Pure hypercholesterolemia E78.00 272.0 LIPID PANEL      AST      ALT       As above,  treatment plan as listed below  Orders Placed This Encounter    LIPID PANEL    METABOLIC PANEL, BASIC    AST    ALT     Follow-up and Dispositions    · Return in about 4 months (around 5/7/2020) for htn, Chol. An After Visit Summary was printed and given to the patient. This has been fully explained to the patient, who indicates understanding.

## 2020-01-25 LAB
ALT SERPL-CCNC: 15 IU/L (ref 0–32)
AST SERPL-CCNC: 20 IU/L (ref 0–40)
BUN SERPL-MCNC: 13 MG/DL (ref 8–27)
BUN/CREAT SERPL: 17 (ref 12–28)
CALCIUM SERPL-MCNC: 9.1 MG/DL (ref 8.7–10.3)
CHLORIDE SERPL-SCNC: 103 MMOL/L (ref 96–106)
CHOLEST SERPL-MCNC: 167 MG/DL (ref 100–199)
CO2 SERPL-SCNC: 26 MMOL/L (ref 20–29)
CREAT SERPL-MCNC: 0.78 MG/DL (ref 0.57–1)
GLUCOSE SERPL-MCNC: 89 MG/DL (ref 65–99)
HDLC SERPL-MCNC: 79 MG/DL
LDLC SERPL CALC-MCNC: 67 MG/DL (ref 0–99)
POTASSIUM SERPL-SCNC: 4.5 MMOL/L (ref 3.5–5.2)
SODIUM SERPL-SCNC: 142 MMOL/L (ref 134–144)
TRIGL SERPL-MCNC: 103 MG/DL (ref 0–149)
VLDLC SERPL CALC-MCNC: 21 MG/DL (ref 5–40)

## 2020-02-29 RX ORDER — AMLODIPINE BESYLATE 5 MG/1
TABLET ORAL
Qty: 90 TAB | Refills: 1 | Status: SHIPPED | OUTPATIENT
Start: 2020-02-29 | End: 2020-07-21

## 2020-04-21 DIAGNOSIS — E78.00 PURE HYPERCHOLESTEROLEMIA: ICD-10-CM

## 2020-04-22 RX ORDER — SIMVASTATIN 20 MG/1
TABLET, FILM COATED ORAL
Qty: 90 TAB | Refills: 1 | Status: SHIPPED | OUTPATIENT
Start: 2020-04-22 | End: 2020-11-06

## 2020-05-12 ENCOUNTER — VIRTUAL VISIT (OUTPATIENT)
Dept: FAMILY MEDICINE CLINIC | Age: 80
End: 2020-05-12

## 2020-05-12 DIAGNOSIS — E78.00 PURE HYPERCHOLESTEROLEMIA: ICD-10-CM

## 2020-05-12 DIAGNOSIS — R73.09 ELEVATED HEMOGLOBIN A1C: ICD-10-CM

## 2020-05-12 DIAGNOSIS — I10 ESSENTIAL HYPERTENSION: Primary | ICD-10-CM

## 2020-05-12 DIAGNOSIS — E55.9 HYPOVITAMINOSIS D: ICD-10-CM

## 2020-05-12 PROBLEM — J98.4 PULMONARY SCARRING: Status: ACTIVE | Noted: 2020-05-12

## 2020-05-12 RX ORDER — LATANOPROST 50 UG/ML
1 SOLUTION/ DROPS OPHTHALMIC
COMMUNITY

## 2020-05-12 NOTE — PATIENT INSTRUCTIONS
DASH Diet: Care Instructions Your Care Instructions The DASH diet is an eating plan that can help lower your blood pressure. DASH stands for Dietary Approaches to Stop Hypertension. Hypertension is high blood pressure. The DASH diet focuses on eating foods that are high in calcium, potassium, and magnesium. These nutrients can lower blood pressure. The foods that are highest in these nutrients are fruits, vegetables, low-fat dairy products, nuts, seeds, and legumes. But taking calcium, potassium, and magnesium supplements instead of eating foods that are high in those nutrients does not have the same effect. The DASH diet also includes whole grains, fish, and poultry. The DASH diet is one of several lifestyle changes your doctor may recommend to lower your high blood pressure. Your doctor may also want you to decrease the amount of sodium in your diet. Lowering sodium while following the DASH diet can lower blood pressure even further than just the DASH diet alone. Follow-up care is a key part of your treatment and safety. Be sure to make and go to all appointments, and call your doctor if you are having problems. It's also a good idea to know your test results and keep a list of the medicines you take. How can you care for yourself at home? Following the DASH diet · Eat 4 to 5 servings of fruit each day. A serving is 1 medium-sized piece of fruit, ½ cup chopped or canned fruit, 1/4 cup dried fruit, or 4 ounces (½ cup) of fruit juice. Choose fruit more often than fruit juice. · Eat 4 to 5 servings of vegetables each day. A serving is 1 cup of lettuce or raw leafy vegetables, ½ cup of chopped or cooked vegetables, or 4 ounces (½ cup) of vegetable juice. Choose vegetables more often than vegetable juice. · Get 2 to 3 servings of low-fat and fat-free dairy each day. A serving is 8 ounces of milk, 1 cup of yogurt, or 1 ½ ounces of cheese. · Eat 6 to 8 servings of grains each day. A serving is 1 slice of bread, 1 ounce of dry cereal, or ½ cup of cooked rice, pasta, or cooked cereal. Try to choose whole-grain products as much as possible. · Limit lean meat, poultry, and fish to 2 servings each day. A serving is 3 ounces, about the size of a deck of cards. · Eat 4 to 5 servings of nuts, seeds, and legumes (cooked dried beans, lentils, and split peas) each week. A serving is 1/3 cup of nuts, 2 tablespoons of seeds, or ½ cup of cooked beans or peas. · Limit fats and oils to 2 to 3 servings each day. A serving is 1 teaspoon of vegetable oil or 2 tablespoons of salad dressing. · Limit sweets and added sugars to 5 servings or less a week. A serving is 1 tablespoon jelly or jam, ½ cup sorbet, or 1 cup of lemonade. · Eat less than 2,300 milligrams (mg) of sodium a day. If you limit your sodium to 1,500 mg a day, you can lower your blood pressure even more. Tips for success · Start small. Do not try to make dramatic changes to your diet all at once. You might feel that you are missing out on your favorite foods and then be more likely to not follow the plan. Make small changes, and stick with them. Once those changes become habit, add a few more changes. · Try some of the following: ? Make it a goal to eat a fruit or vegetable at every meal and at snacks. This will make it easy to get the recommended amount of fruits and vegetables each day. ? Try yogurt topped with fruit and nuts for a snack or healthy dessert. ? Add lettuce, tomato, cucumber, and onion to sandwiches. ? Combine a ready-made pizza crust with low-fat mozzarella cheese and lots of vegetable toppings. Try using tomatoes, squash, spinach, broccoli, carrots, cauliflower, and onions. ? Have a variety of cut-up vegetables with a low-fat dip as an appetizer instead of chips and dip. ? Sprinkle sunflower seeds or chopped almonds over salads.  Or try adding chopped walnuts or almonds to cooked vegetables. ? Try some vegetarian meals using beans and peas. Add garbanzo or kidney beans to salads. Make burritos and tacos with mashed francis beans or black beans. Where can you learn more? Go to http://adonay-isaiah.info/ Enter P760 in the search box to learn more about \"DASH Diet: Care Instructions. \" Current as of: December 15, 2019Content Version: 12.4 © 6843-1704 [employer]. Care instructions adapted under license by [employer] (which disclaims liability or warranty for this information). If you have questions about a medical condition or this instruction, always ask your healthcare professional. [address] any warranty or liability for your use of this information.

## 2020-05-12 NOTE — PROGRESS NOTES
Jhon Muñoz is a 78 y.o. female who was seen by synchronous (real-time) audio-video technology on 5/12/2020. Consent: Jhon Muñoz, who was seen by synchronous (real-time) audio-video technology, and/or her healthcare decision maker, is aware that this patient-initiated, Telehealth encounter on 5/12/2020 is a billable service, with coverage as determined by her insurance carrier. She is aware that she may receive a bill and has provided verbal consent to proceed: Yes. Assessment & Plan:   Diagnoses and all orders for this visit:    1. Essential hypertension  -     METABOLIC PANEL, BASIC; Future  -     CBC WITH AUTOMATED DIFF; Future    2. Pure hypercholesterolemia  -     LIPID PANEL; Future  -     AST; Future  -     ALT; Future  -     CBC WITH AUTOMATED DIFF; Future    3. Elevated hemoglobin A1c  -     HEMOGLOBIN A1C WITH EAG; Future    4. Hypovitaminosis D  -     CBC WITH AUTOMATED DIFF; Future  -     VITAMIN D, 25 HYDROXY; Future      As above,   above all stable unless otherwise noted   treatment plan as listed below  Orders Placed This Encounter    LIPID PANEL    METABOLIC PANEL, BASIC    AST    ALT    CBC WITH AUTOMATED DIFF    VITAMIN D, 25 HYDROXY    HEMOGLOBIN A1C WITH EAG    latanoprost (XALATAN) 0.005 % ophthalmic solution     meds reconciled  Labs ordered to be taken prior to her next visit. Follow-up and Dispositions    · Return in about 4 months (around 9/12/2020) for medicare well. avs is available via [email or web address]  Subjective:   Jhon Muñoz is a 78 y.o. female who was seen for Hypertension; Vitamin D Deficiency; High Blood Sugar (prediabetes ( per chart review)); and Cholesterol Problem  Pt has been doing well; She has no new complaints  She has taken her vitals and they are as listed below:  BP: 136/74  Pulse: 74  02 sat;99%  Weight 142    Will be having a CT of the lungs later this month. She has a h/o pulmonary scarring.   Pt has HTN;  She is on meds as listed below; no refills needed  She is taking vitamin D supplements. Will plan to check lab again at follow up. She continues on zocor for hypercholesterolemia; She eats a lower cholesterol diet. Stays active      Lab Results   Component Value Date/Time    Cholesterol, total 167 01/24/2020 08:21 AM    HDL Cholesterol 79 01/24/2020 08:21 AM    LDL, calculated 67 01/24/2020 08:21 AM    VLDL, calculated 21 01/24/2020 08:21 AM    Triglyceride 103 01/24/2020 08:21 AM    CHOL/HDL Ratio 3.1 11/02/2010 08:00 AM         Prior to Admission medications    Medication Sig Start Date End Date Taking? Authorizing Provider   simvastatin (ZOCOR) 20 mg tablet TAKE 1 TABLET EVERY NIGHT. 4/22/20  Yes Liz Pierce MD   amLODIPine (NORVASC) 5 mg tablet TAKE 1 TABLET EVERY DAY 2/29/20  Yes Liz Pierce MD   tiotropium-olodaterol (STIOLTO RESPIMAT) 2.5-2.5 mcg/actuation inhaler Take 2 Puffs by inhalation daily. 7/11/19  Yes Provider, Historical   Colostrum/selen/gr t/Mush Cmb1 (COLOSTRUM-SE-GRTEA-MUSHROOMCMB PO) Take  by mouth daily. Yes Provider, Historical   cholecalciferol, VITAMIN D3, (VITAMIN D3) 5,000 unit tab tablet Take  by mouth daily. Yes Provider, Historical   albuterol (PROVENTIL HFA, VENTOLIN HFA, PROAIR HFA) 90 mcg/actuation inhaler Take 1 Puff by inhalation every four (4) hours as needed for Wheezing. 6/12/17  Yes Cristian Moreno, DO   MULTIVIT-MIN/FA/LUTEIN/ZEAXANT (ICAPS MV PO) Take 1 Tab by mouth four (4) times daily. Yes Provider, Historical   glucosamine 1,000 mg Tab Take 1,000 mg by mouth daily. Yes Provider, Historical   calcium-vitamin D (CALCIUM 500+D) 500 mg(1,250mg) -200 unit per tablet Take 1 Tab by mouth two (2) times daily (with meals).  With magnesium    Yes Provider, Historical     Allergies   Allergen Reactions    Augmentin [Amoxicillin-Pot Clavulanate] Diarrhea     severe    Sulfamerazine Diarrhea       Patient Active Problem List    Diagnosis Date Noted    Pulmonary scarring 05/12/2020    Prediabetes 06/12/2017    Hypovitaminosis D 01/11/2017    Medicare annual wellness visit, subsequent 12/12/2016    Screening for depression 12/12/2016    Pulmonary nodules 06/08/2016    Encounter for long-term (current) use of medications 06/08/2016    Advance care planning 12/07/2015    COPD (chronic obstructive pulmonary disease) ([facility] [address] 06/17/2015    DUBON (dyspnea on exertion) 06/16/2015    Chest pressure 05/20/2015    Fatigue 05/20/2015    Cough 05/20/2015    Low serum ferritin level 11/14/2014    RAD (reactive airway disease) 07/09/2014    Osteopenia 11/16/2011    AR (allergic rhinitis) 11/16/2011    Hyperlipidemia 11/16/2011    HTN (hypertension) 05/13/2010     Allergies   Allergen Reactions    Augmentin [Amoxicillin-Pot Clavulanate] Diarrhea     severe    Sulfamerazine Diarrhea     Past Medical History:   Diagnosis Date    Allergic rhinitis     AR (allergic rhinitis) 11/16/2011    Cataract     Chronic sinusitis     Hyperlipidaemia     Hyperlipidemia 11/16/2011    Hypertension     Mild Diastolic    Low ferritin level     Osteopenia 11/16/2011    Osteoporosis     Pneumonia     Sleep apnea     Vitamin D deficiencies     Mild     Past Surgical History:   Procedure Laterality Date    ABDOMEN SURGERY PROC UNLISTED  1964    Hysterectomy    ENDOSCOPY, COLON, DIAGNOSTIC  12/4/01    PT DECLINED     Family History   Problem Relation Age of Onset    Hypertension Mother     Other Mother         CAD    Cancer Mother         Possibly Breast    COPD Father     Cancer Father         Some Form     Social History     Tobacco Use    Smoking status: Never Smoker    Smokeless tobacco: Never Used   Substance Use Topics    Alcohol use: No       Lab Results   Component Value Date/Time    Cholesterol, total 167 01/24/2020 08:21 AM    HDL Cholesterol 79 01/24/2020 08:21 AM    LDL, calculated 67 01/24/2020 08:21 AM    VLDL, calculated 21 01/24/2020 08:21 AM    Triglyceride 103 01/24/2020 08:21 AM    CHOL/HDL Ratio 3.1 11/02/2010 08:00 AM     Lab Results   Component Value Date/Time    Sodium 142 01/24/2020 08:21 AM    Potassium 4.5 01/24/2020 08:21 AM    Chloride 103 01/24/2020 08:21 AM    CO2 26 01/24/2020 08:21 AM    Anion gap 4 06/16/2015 10:15 AM    Glucose 89 01/24/2020 08:21 AM    BUN 13 01/24/2020 08:21 AM    Creatinine 0.78 01/24/2020 08:21 AM    BUN/Creatinine ratio 17 01/24/2020 08:21 AM    GFR est AA 82 01/24/2020 08:21 AM    GFR est non-AA 72 01/24/2020 08:21 AM    Calcium 9.1 01/24/2020 08:21 AM         Review of Systems   Constitutional: Negative for chills and fever. Respiratory: Negative for shortness of breath and wheezing. Cardiovascular: Negative for chest pain and palpitations. Objective: There were no vitals taken for this visit. General: alert, cooperative, no distress   Mental  status: normal mood, behavior, speech, dress, motor activity, and thought processes, able to follow commands   HENT: NCAT   Neck: no visualized mass   Resp: no respiratory distress   Neuro: no gross deficits   Skin: no discoloration or lesions of concern on visible areas   Psychiatric: normal affect, consistent with stated mood, no evidence of hallucinations     Additional exam findings: We discussed the expected course, resolution and complications of the diagnosis(es) in detail. Medication risks, benefits, costs, interactions, and alternatives were discussed as indicated. I advised her to contact the office if her condition worsens, changes or fails to improve as anticipated. She expressed understanding with the diagnosis(es) and plan. Michel Lopez is a 78 y.o. female who was evaluated by a video visit encounter for concerns as above. Patient identification was verified prior to start of the visit. A caregiver was present when appropriate.  Due to this being a TeleHealth encounter (During [place] [address] public health emergency), evaluation of the following organ systems was limited: Vitals/Constitutional/EENT/Resp/CV/GI//MS/Neuro/Skin/Heme-Lymph-Imm. Pursuant to the emergency declaration under the [facility], [facility] waiver authority and the [employer] Act, this Virtual  Visit was conducted, with patient's (and/or legal guardian's) consent, to reduce the patient's risk of exposure to COVID-19 and provide necessary medical care. Services were provided through a video synchronous discussion virtually to substitute for in-person clinic visit.    Patient was at home and provider was at the Uriel Singleton MD

## 2020-07-21 RX ORDER — AMLODIPINE BESYLATE 5 MG/1
TABLET ORAL
Qty: 90 TAB | Refills: 1 | Status: SHIPPED | OUTPATIENT
Start: 2020-07-21 | End: 2021-01-07

## 2020-09-09 ENCOUNTER — VIRTUAL VISIT (OUTPATIENT)
Dept: FAMILY MEDICINE CLINIC | Age: 80
End: 2020-09-09

## 2020-09-09 DIAGNOSIS — Z00.00 MEDICARE ANNUAL WELLNESS VISIT, SUBSEQUENT: Primary | ICD-10-CM

## 2020-09-09 NOTE — PATIENT INSTRUCTIONS
Medicare Wellness Visit, Female The best way to live healthy is to have a lifestyle where you eat a well-balanced diet, exercise regularly, limit alcohol use, and quit all forms of tobacco/nicotine, if applicable. Regular preventive services are another way to keep healthy. Preventive services (vaccines, screening tests, monitoring & exams) can help personalize your care plan, which helps you manage your own care. Screening tests can find health problems at the earliest stages, when they are easiest to treat. [facility] follows the current, evidence-based guidelines published by the [facility] ([facility]) when recommending preventive services for our patients. Because we follow these guidelines, sometimes recommendations change over time as research supports it. (For example, mammograms used to be recommended annually. Even though Medicare will still pay for an annual mammogram, the newer guidelines recommend a mammogram every two years for women of average risk). Of course, you and your doctor may decide to screen more often for some diseases, based on your risk and your co-morbidities (chronic disease you are already diagnosed with). Preventive services for you include: - Medicare offers their members a free annual wellness visit, which is time for you and your primary care provider to discuss and plan for your preventive service needs. Take advantage of this benefit every year! 
-All adults over the age of 72 should receive the recommended pneumonia vaccines. Current USPSTF guidelines recommend a series of two vaccines for the best pneumonia protection.  
-All adults should have a flu vaccine yearly and a tetanus vaccine every 10 years.  
-All adults age 48 and older should receive the shingles vaccines (series of two vaccines). -All adults age 38-68 who are overweight should have a diabetes screening test once every three years. -All adults born between [birth year] and 1965 should be screened once for Hepatitis C. 
-Other screening tests and preventive services for persons with diabetes include: an eye exam to screen for diabetic retinopathy, a kidney function test, a foot exam, and stricter control over your cholesterol.  
-Cardiovascular screening for adults with routine risk involves an electrocardiogram (ECG) at intervals determined by your doctor.  
-Colorectal cancer screenings should be done for adults age 54-65 with no increased risk factors for colorectal cancer. There are a number of acceptable methods of screening for this type of cancer. Each test has its own benefits and drawbacks. Discuss with your doctor what is most appropriate for you during your annual wellness visit. The different tests include: colonoscopy (considered the best screening method), a fecal occult blood test, a fecal DNA test, and sigmoidoscopy. 
 
-A bone mass density test is recommended when a woman turns 65 to screen for osteoporosis. This test is only recommended one time, as a screening. Some providers will use this same test as a disease monitoring tool if you already have osteoporosis. -Breast cancer screenings are recommended every other year for women of normal risk, age 54-69. 
-Cervical cancer screenings for women over age 72 are only recommended with certain risk factors. Here is a list of your current Health Maintenance items (your personalized list of preventive services) with a due date: 
Health Maintenance Due Topic Date Due  Yearly Flu Vaccine (1) 09/01/2020 Medicare Wellness Visit, Female The best way to live healthy is to have a lifestyle where you eat a well-balanced diet, exercise regularly, limit alcohol use, and quit all forms of tobacco/nicotine, if applicable. Regular preventive services are another way to keep healthy.  Preventive services (vaccines, screening tests, monitoring & exams) can help personalize your care plan, which helps you manage your own care. Screening tests can find health problems at the earliest stages, when they are easiest to treat. [facility] follows the current, evidence-based guidelines published by the [facility] ([facility]) when recommending preventive services for our patients. Because we follow these guidelines, sometimes recommendations change over time as research supports it. (For example, mammograms used to be recommended annually. Even though Medicare will still pay for an annual mammogram, the newer guidelines recommend a mammogram every two years for women of average risk). Of course, you and your doctor may decide to screen more often for some diseases, based on your risk and your co-morbidities (chronic disease you are already diagnosed with). Preventive services for you include: - Medicare offers their members a free annual wellness visit, which is time for you and your primary care provider to discuss and plan for your preventive service needs. Take advantage of this benefit every year! 
-All adults over the age of 72 should receive the recommended pneumonia vaccines. Current USPSTF guidelines recommend a series of two vaccines for the best pneumonia protection.  
-All adults should have a flu vaccine yearly and a tetanus vaccine every 10 years.  
-All adults age 48 and older should receive the shingles vaccines (series of two vaccines).      
-All adults age 38-68 who are overweight should have a diabetes screening test once every three years.  
-All adults born between [birth year] and 1965 should be screened once for Hepatitis C. 
-Other screening tests and preventive services for persons with diabetes include: an eye exam to screen for diabetic retinopathy, a kidney function test, a foot exam, and stricter control over your cholesterol.  
-Cardiovascular screening for adults with routine risk involves an electrocardiogram (ECG) at intervals determined by your doctor.  
-Colorectal cancer screenings should be done for adults age 54-65 with no increased risk factors for colorectal cancer. There are a number of acceptable methods of screening for this type of cancer. Each test has its own benefits and drawbacks. Discuss with your doctor what is most appropriate for you during your annual wellness visit. The different tests include: colonoscopy (considered the best screening method), a fecal occult blood test, a fecal DNA test, and sigmoidoscopy. 
 
-A bone mass density test is recommended when a woman turns 65 to screen for osteoporosis. This test is only recommended one time, as a screening. Some providers will use this same test as a disease monitoring tool if you already have osteoporosis. -Breast cancer screenings are recommended every other year for women of normal risk, age 54-69. 
-Cervical cancer screenings for women over age 72 are only recommended with certain risk factors. Here is a list of your current Health Maintenance items (your personalized list of preventive services) with a due date: 
Health Maintenance Due Topic Date Due  Yearly Flu Vaccine (1) 09/01/2020

## 2020-09-09 NOTE — PROGRESS NOTES
This is the Subsequent Medicare Annual Wellness Exam, performed 12 months or more after the Initial AWV or the last Subsequent AWV    I have reviewed the patient's medical history in detail and updated the computerized patient record. Pt has been well; she has no new complaints. She has been socially distancing and wearing a face covering. History     Patient Active Problem List   Diagnosis Code    HTN (hypertension) I10    Osteopenia M85.80    AR (allergic rhinitis) J30.9    Hyperlipidemia E78.5    RAD (reactive airway disease) J45.909    Low serum ferritin level R79.0    Chest pressure R07.89    Fatigue R53.83    Cough R05    DUBON (dyspnea on exertion) R06.00    COPD (chronic obstructive pulmonary disease) ([facility] [address] J44.9    Advance care planning Z71.89    Pulmonary nodules R91.8    Encounter for long-term (current) use of medications Z79.899    Medicare annual wellness visit, subsequent Z00.00    Screening for depression Z13.31    Hypovitaminosis D E55.9    Prediabetes R73.03    Pulmonary scarring J98.4     Past Medical History:   Diagnosis Date    Allergic rhinitis     AR (allergic rhinitis) 11/16/2011    Cataract     Chronic sinusitis     Hyperlipidaemia     Hyperlipidemia 11/16/2011    Hypertension     Mild Diastolic    Low ferritin level     Osteopenia 11/16/2011    Osteoporosis     Pneumonia     Sleep apnea     Vitamin D deficiencies     Mild      Past Surgical History:   Procedure Laterality Date    ABDOMEN SURGERY PROC UNLISTED  1964    Hysterectomy    ENDOSCOPY, COLON, DIAGNOSTIC  12/4/01    PT DECLINED     Current Outpatient Medications   Medication Sig Dispense Refill    amLODIPine (NORVASC) 5 mg tablet TAKE 1 TABLET EVERY DAY 90 Tab 1    latanoprost (XALATAN) 0.005 % ophthalmic solution Administer 1 Drop to both eyes nightly.  simvastatin (ZOCOR) 20 mg tablet TAKE 1 TABLET EVERY NIGHT.  90 Tab 1    tiotropium-olodaterol (STIOLTO RESPIMAT) 2.5-2.5 mcg/actuation inhaler Take 2 Puffs by inhalation daily.  Colostrum/selen/gr t/Mush Cmb1 (COLOSTRUM-SE-GRTEA-MUSHROOMCMB PO) Take  by mouth daily.  cholecalciferol, VITAMIN D3, (VITAMIN D3) 5,000 unit tab tablet Take  by mouth daily.  albuterol (PROVENTIL HFA, VENTOLIN HFA, PROAIR HFA) 90 mcg/actuation inhaler Take 1 Puff by inhalation every four (4) hours as needed for Wheezing. 1 Inhaler 3    MULTIVIT-MIN/FA/LUTEIN/ZEAXANT (ICAPS MV PO) Take 1 Tab by mouth four (4) times daily.  glucosamine 1,000 mg Tab Take 1,000 mg by mouth daily.  calcium-vitamin D (CALCIUM 500+D) 500 mg(1,250mg) -200 unit per tablet Take 1 Tab by mouth two (2) times daily (with meals). With magnesium        Allergies   Allergen Reactions    Augmentin [Amoxicillin-Pot Clavulanate] Diarrhea     severe    Sulfamerazine Diarrhea       Family History   Problem Relation Age of Onset    Hypertension Mother     Other Mother         CAD   [facility] Cancer Mother         Possibly Breast    COPD Father     Cancer Father         Some Form     Social History     Tobacco Use    Smoking status: Never Smoker    Smokeless tobacco: Never Used   Substance Use Topics    Alcohol use: No       Depression Risk Factor Screening:     3 most recent PHQ Screens 9/9/2020   Little interest or pleasure in doing things Not at all   Feeling down, depressed, irritable, or hopeless Not at all   Total Score PHQ 2 0       Alcohol Risk Screen     Alcohol Risk Factor Screening:   Do you average 1 drink per night or more than 7 drinks a week:  No    On any one occasion in the past three months have you have had more than 3 drinks containing alcohol:  No        Functional Ability and Level of Safety:   Hearing: The patient wears hearing aids. Activities of Daily Living:   The home contains: handrails and grab bars  Patient does total self care     Ambulation: with mild difficulty and \" more wobbly\"      Fall Risk:  Fall Risk Assessment, last 12 mths 9/9/2020   Able to walk? Yes   Fall in past 12 months? No     Abuse Screen:  Patient is not abused       Cognitive Screening   Has your family/caregiver stated any concerns about your memory: no    Cognitive Screening: Normal - cognition    Patient Care Team   Patient Care Team:  King Bueno MD as PCP - General (Family Medicine)  King Bueno MD as PCP - [facility] Provider  Jam Arias MD (Pulmonary Disease)    Assessment/Plan   Education and counseling provided:  Are appropriate based on today's review and evaluation  End-of-Life planning (with patient's consent)    Diagnoses and all orders for this visit:    1. Medicare annual wellness visit, subsequent        Health Maintenance Due   Topic Date Due    Flu Vaccine (1) 09/01/2020    Medicare Yearly Exam  09/04/2020       Ana M Lockhart, who was evaluated through a synchronous (real-time) audio-video encounter, and/or her healthcare decision maker, is aware that it is a billable service, with coverage as determined by her insurance carrier. She provided verbal consent to proceed: Yes, and patient identification was verified. It was conducted pursuant to the emergency declaration under the [facility], [address] [place] [address] [facility] authority and the [employer] Act. A caregiver was present when appropriate. Ability to conduct physical exam was limited. I was in the office. The patient was at home.     Vish Worthington MD

## 2020-09-21 ENCOUNTER — CLINICAL SUPPORT (OUTPATIENT)
Dept: FAMILY MEDICINE CLINIC | Age: 80
End: 2020-09-21

## 2020-09-21 DIAGNOSIS — Z23 NEEDS FLU SHOT: Primary | ICD-10-CM

## 2020-09-21 NOTE — PROGRESS NOTES
Erika Parnell is a 78 y.o. female who presents for routine immunizations. She denies any symptoms , reactions or allergies that would exclude them from being immunized today. Risks and adverse reactions were discussed and the VIS was given to them. All questions were addressed. She refused to stay in clinic for post vaccine observation. There were no reactions observed.

## 2021-01-12 ENCOUNTER — VIRTUAL VISIT (OUTPATIENT)
Dept: FAMILY MEDICINE CLINIC | Age: 81
End: 2021-01-12

## 2021-01-12 NOTE — PROGRESS NOTES
Attempted telemed visit with pt. No connection made as of 9:39 am  This encounter was created in error - please disregard.

## 2021-01-19 ENCOUNTER — VIRTUAL VISIT (OUTPATIENT)
Dept: FAMILY MEDICINE CLINIC | Age: 81
End: 2021-01-19
Payer: MEDICARE

## 2021-01-19 DIAGNOSIS — M79.646 THUMB PAIN, UNSPECIFIED LATERALITY: Primary | ICD-10-CM

## 2021-01-19 PROCEDURE — G8427 DOCREV CUR MEDS BY ELIG CLIN: HCPCS | Performed by: FAMILY MEDICINE

## 2021-01-19 PROCEDURE — G8421 BMI NOT CALCULATED: HCPCS | Performed by: FAMILY MEDICINE

## 2021-01-19 PROCEDURE — 99212 OFFICE O/P EST SF 10 MIN: CPT | Performed by: FAMILY MEDICINE

## 2021-01-19 PROCEDURE — G8536 NO DOC ELDER MAL SCRN: HCPCS | Performed by: FAMILY MEDICINE

## 2021-01-19 PROCEDURE — 1090F PRES/ABSN URINE INCON ASSESS: CPT | Performed by: FAMILY MEDICINE

## 2021-01-19 PROCEDURE — G8399 PT W/DXA RESULTS DOCUMENT: HCPCS | Performed by: FAMILY MEDICINE

## 2021-01-19 PROCEDURE — G8432 DEP SCR NOT DOC, RNG: HCPCS | Performed by: FAMILY MEDICINE

## 2021-01-19 PROCEDURE — 1101F PT FALLS ASSESS-DOCD LE1/YR: CPT | Performed by: FAMILY MEDICINE

## 2021-01-19 PROCEDURE — G8756 NO BP MEASURE DOC: HCPCS | Performed by: FAMILY MEDICINE

## 2021-01-19 NOTE — PROGRESS NOTES
Darnella Phalen is a 78 y.o. female who was seen by synchronous (real-time) audio-video technology on 1/19/2021 for Thumb Pain (wrist pain)        Assessment & Plan:   Diagnoses and all orders for this visit:    1. Thumb pain, unspecified laterality        As above, better   monitor for now  Follow-up and Dispositions    · Return in about 3 months (around 4/19/2021). Routing History        This has been fully explained to the patient, who indicates understanding. AVS is accessible thru mychart and pt has been advised of same. 712  Subjective:   . PT had had some previous wrist and thumb are pain; It is actually better today. She has otherwise been well; She has no other new complaints. She has done some of her vitals. She is compliant with her meds as listed below;   Prior to Admission medications    Medication Sig Start Date End Date Taking? Authorizing Provider   amLODIPine (NORVASC) 5 mg tablet TAKE 1 TABLET EVERY DAY 1/7/21  Yes Erika Pineda MD   simvastatin (ZOCOR) 20 mg tablet TAKE 1 TABLET EVERY NIGHT. 11/6/20  Yes Erika Pineda MD   latanoprost (XALATAN) 0.005 % ophthalmic solution Administer 1 Drop to both eyes nightly. Yes Provider, Historical   tiotropium-olodaterol (STIOLTO RESPIMAT) 2.5-2.5 mcg/actuation inhaler Take 2 Puffs by inhalation daily. 7/11/19  Yes Provider, Historical   Colostrum/selen/gr t/Mush Cmb1 (COLOSTRUM-SE-GRTEA-MUSHROOMCMB PO) Take  by mouth daily. Yes Provider, Historical   cholecalciferol, VITAMIN D3, (VITAMIN D3) 5,000 unit tab tablet Take  by mouth daily. Yes Provider, Historical   albuterol (PROVENTIL HFA, VENTOLIN HFA, PROAIR HFA) 90 mcg/actuation inhaler Take 1 Puff by inhalation every four (4) hours as needed for Wheezing. 6/12/17  Yes Martha Huerta, DO   MULTIVIT-MIN/FA/LUTEIN/ZEAXANT (ICAPS MV PO) Take 1 Tab by mouth four (4) times daily. Yes Provider, Historical   glucosamine 1,000 mg Tab Take 1,000 mg by mouth daily.    Yes Provider, Historical   calcium-vitamin D (CALCIUM 500+D) 500 mg(1,250mg) -200 unit per tablet Take 1 Tab by mouth two (2) times daily (with meals). 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/12/2020   • Prediabetes 06/12/2017   • Hypovitaminosis D 01/11/2017   • Medicare annual wellness visit, subsequent 12/12/2016   • Screening for depression 12/12/2016   • Pulmonary nodules 06/08/2016   • Encounter for long-term (current) use of medications 06/08/2016   • Advance care planning 12/07/2015   • DUBON (dyspnea on exertion) 06/16/2015   • Chest pressure 05/20/2015   • Fatigue 05/20/2015   • Cough 05/20/2015   • Low serum ferritin level 11/14/2014   • RAD (reactive airway disease) 07/09/2014   • Osteopenia 11/16/2011   • AR (allergic rhinitis) 11/16/2011   • Hyperlipidemia 11/16/2011   • HTN (hypertension) 05/13/2010     Allergies   Allergen Reactions   • Augmentin [Amoxicillin-Pot Clavulanate] Diarrhea     severe   • Sulfamerazine Diarrhea     Past Medical History:   Diagnosis Date   • Allergic rhinitis    • AR (allergic rhinitis) 11/16/2011   • Cataract    • Chronic sinusitis    • Hyperlipidaemia    • Hyperlipidemia 11/16/2011   • Hypertension     Mild Diastolic   • Low ferritin level    • Osteopenia 11/16/2011   • Osteoporosis    • Pneumonia    • Sleep apnea    • Vitamin D deficiencies     Mild     Past Surgical History:   Procedure Laterality Date   • ABDOMEN SURGERY PROC UNLISTED  1964    Hysterectomy   • ENDOSCOPY, COLON, DIAGNOSTIC  12/4/01    PT DECLINED     Family History   Problem Relation Age of Onset   • Hypertension Mother    • Other Mother         CAD   • Cancer Mother         Possibly Breast   • COPD Father    • Cancer Father         Some Form     Social History     Tobacco Use   • Smoking status: Never Smoker   • Smokeless tobacco: Never Used   Substance Use Topics   • Alcohol use: No       Review of Systems   Constitutional:  Negative for chills and fever. Cardiovascular: Negative for chest pain and palpitations. Objective:     Patient-Reported Vitals 1/24/2021   Patient-Reported Weight 140 lbs   Patient-Reported Pulse 72   Patient-Reported SpO2 97%      General: alert, cooperative, no distress   Mental  status: normal mood, behavior, speech, dress, motor activity, and thought processes, able to follow commands   HENT: NCAT   Neck: no visualized mass   Resp: no respiratory distress   Neuro: no gross deficits   Skin: no discoloration or lesions of concern on visible areas   Psychiatric: normal affect, consistent with stated mood, no evidence of hallucinations     Additional exam findings: none      We discussed the expected course, resolution and complications of the diagnosis(es) in detail. Medication risks, benefits, costs, interactions, and alternatives were discussed as indicated. I advised her to contact the office if her condition worsens, changes or fails to improve as anticipated. She expressed understanding with the diagnosis(es) and plan. Jhon Muñoz, who was evaluated through a patient-initiated, synchronous (real-time) audio-video encounter, and/or her healthcare decision maker, is aware that it is a billable service, with coverage as determined by her insurance carrier. She provided verbal consent to proceed: Yes, and patient identification was verified. It was conducted pursuant to the emergency declaration under the [address] [place] [address], [address] [place] [address] authority and the [employer] Act. A caregiver was present when appropriate. Ability to conduct physical exam was limited. I was at home. The patient was at home.       Emily Mar MD

## 2021-01-24 NOTE — PATIENT INSTRUCTIONS

## 2021-01-26 ENCOUNTER — TELEPHONE (OUTPATIENT)
Dept: FAMILY MEDICINE CLINIC | Age: 81
End: 2021-01-26

## 2021-05-26 ENCOUNTER — OFFICE VISIT (OUTPATIENT)
Dept: FAMILY MEDICINE CLINIC | Age: 81
End: 2021-05-26
Payer: MEDICARE

## 2021-05-26 VITALS
HEIGHT: 65 IN | BODY MASS INDEX: 23.66 KG/M2 | WEIGHT: 142 LBS | DIASTOLIC BLOOD PRESSURE: 80 MMHG | SYSTOLIC BLOOD PRESSURE: 136 MMHG | HEART RATE: 67 BPM | RESPIRATION RATE: 16 BRPM | OXYGEN SATURATION: 96 %

## 2021-05-26 DIAGNOSIS — E78.00 PURE HYPERCHOLESTEROLEMIA: Primary | ICD-10-CM

## 2021-05-26 DIAGNOSIS — E55.9 HYPOVITAMINOSIS D: ICD-10-CM

## 2021-05-26 DIAGNOSIS — R73.09 ELEVATED HEMOGLOBIN A1C: ICD-10-CM

## 2021-05-26 PROCEDURE — G8752 SYS BP LESS 140: HCPCS | Performed by: FAMILY MEDICINE

## 2021-05-26 PROCEDURE — G8536 NO DOC ELDER MAL SCRN: HCPCS | Performed by: FAMILY MEDICINE

## 2021-05-26 PROCEDURE — 1101F PT FALLS ASSESS-DOCD LE1/YR: CPT | Performed by: FAMILY MEDICINE

## 2021-05-26 PROCEDURE — 1090F PRES/ABSN URINE INCON ASSESS: CPT | Performed by: FAMILY MEDICINE

## 2021-05-26 PROCEDURE — G8420 CALC BMI NORM PARAMETERS: HCPCS | Performed by: FAMILY MEDICINE

## 2021-05-26 PROCEDURE — 99214 OFFICE O/P EST MOD 30 MIN: CPT | Performed by: FAMILY MEDICINE

## 2021-05-26 PROCEDURE — G8754 DIAS BP LESS 90: HCPCS | Performed by: FAMILY MEDICINE

## 2021-05-26 PROCEDURE — G8427 DOCREV CUR MEDS BY ELIG CLIN: HCPCS | Performed by: FAMILY MEDICINE

## 2021-05-26 PROCEDURE — G8510 SCR DEP NEG, NO PLAN REQD: HCPCS | Performed by: FAMILY MEDICINE

## 2021-05-26 PROCEDURE — G8399 PT W/DXA RESULTS DOCUMENT: HCPCS | Performed by: FAMILY MEDICINE

## 2021-05-26 NOTE — PROGRESS NOTES
Nakul Ramos presents today for   Chief Complaint   Patient presents with    Hypertension       Is someone accompanying this pt? No    Is the patient using any DME equipment during OV? No    Depression Screening:  3 most recent PHQ Screens 5/26/2021   Little interest or pleasure in doing things Not at all   Feeling down, depressed, irritable, or hopeless Not at all   Total Score PHQ 2 0       Learning Assessment:  Learning Assessment 6/13/2018   PRIMARY LEARNER Patient   HIGHEST LEVEL OF EDUCATION - PRIMARY LEARNER  -   BARRIERS PRIMARY LEARNER -   CO-LEARNER CAREGIVER -   PRIMARY LANGUAGE ENGLISH   LEARNER PREFERENCE PRIMARY OTHER (COMMENT)   ANSWERED BY patient   RELATIONSHIP SELF       Fall Risk  Fall Risk Assessment, last 12 mths 9/9/2020   Able to walk? Yes   Fall in past 12 months? No       ADL  No flowsheet data found. Travel Screening:    Travel Screening     Question   Response    In the last month, have you been in contact with someone who was confirmed or suspected to have Coronavirus / COVID-19? No / Unsure    Have you had a COVID-19 viral test in the last 14 days? No    Do you have any of the following new or worsening symptoms? None of these    Have you traveled internationally or domestically in the last month? Travel History   Travel since 04/26/21     No documented travel since 04/26/21          Health Maintenance reviewed and discussed and ordered per Provider. There are no preventive care reminders to display for this patient. .      Coordination of Care:  1. Have you been to the ER, urgent care clinic since your last visit? Hospitalized since your last visit? No    2. Have you seen or consulted any other health care providers outside of the [address] [place] [address] since your last visit? Include any pap smears or colon screening.  No

## 2021-05-26 NOTE — PATIENT INSTRUCTIONS

## 2021-05-26 NOTE — PROGRESS NOTES
HPI:  Lorena Loya is a 78 y.o. female who presents today with   Chief Complaint   Patient presents with    Hypertension        Initial BP is up ; better at second check. She is on medications as listed below. She takes her medicine daily. Patient also has had an elevated blood sugar. She tries to adhere to a lower sugar diet. She does stay active. She does report some recent situational stressors. She has currently downsized and moved. She is adjusting fairly well. Patient also has hypercholesterolemia. She is on medications as listed below. She is compliant with her medication regimen. Needs a recheck on her vitamin D level. PMH,  Meds, Allergies, Family History, Social history reviewed      3 most recent PHQ Screens 5/26/2021   Little interest or pleasure in doing things Not at all   Feeling down, depressed, irritable, or hopeless Not at all   Total Score PHQ 2 0               PMH,  Meds, Allergies, Family History, Social history reviewed      Current Outpatient Medications   Medication Sig Dispense Refill    amLODIPine (NORVASC) 5 mg tablet TAKE 1 TABLET EVERY DAY 90 Tab 3    simvastatin (ZOCOR) 20 mg tablet TAKE 1 TABLET EVERY NIGHT. 90 Tab 3    latanoprost (XALATAN) 0.005 % ophthalmic solution Administer 1 Drop to both eyes nightly.  tiotropium-olodaterol (STIOLTO RESPIMAT) 2.5-2.5 mcg/actuation inhaler Take 2 Puffs by inhalation daily.  Colostrum/selen/gr t/Mush Cmb1 (COLOSTRUM-SE-GRTEA-MUSHROOMCMB PO) Take  by mouth daily.  cholecalciferol, VITAMIN D3, (VITAMIN D3) 5,000 unit tab tablet Take  by mouth daily.  albuterol (PROVENTIL HFA, VENTOLIN HFA, PROAIR HFA) 90 mcg/actuation inhaler Take 1 Puff by inhalation every four (4) hours as needed for Wheezing. 1 Inhaler 3    MULTIVIT-MIN/FA/LUTEIN/ZEAXANT (ICAPS MV PO) Take 1 Tab by mouth four (4) times daily.  glucosamine 1,000 mg Tab Take 1,000 mg by mouth daily.       calcium-vitamin D (CALCIUM 500+D) 500 mg(1,250mg) -200 unit per tablet Take 1 Tab by mouth two (2) times daily (with meals). With magnesium           Allergies   Allergen Reactions    Augmentin [Amoxicillin-Pot Clavulanate] Diarrhea     severe    Sulfamerazine Diarrhea                  ROS as per HPI      Visit Vitals  /80   Pulse 67   Resp 16   Ht 5' 5\" (1.651 m)   Wt 142 lb (64.4 kg)   SpO2 96%   BMI 23.63 kg/m²     Physical Exam   Visit Vitals  /80   Pulse 67   Resp 16   Ht 5' 5\" (1.651 m)   Wt 142 lb (64.4 kg)   SpO2 96%   BMI 23.63 kg/m²     General appearance: alert, cooperative, no distress, appears stated age  Neck: supple, symmetrical, trachea midline, no adenopathy, thyroid: not enlarged, symmetric, no tenderness/mass/nodules, no carotid bruit and no JVD  Lungs: clear to auscultation bilaterally  Heart: regular rate and rhythm, S1, S2 normal, no murmur, click, rub or gallop  Extremities: extremities normal, atraumatic, no cyanosis or edema    Assessment/Plan:    Diagnoses and all orders for this visit:    1. Pure hypercholesterolemia  -     LIPID PANEL; Future  -     METABOLIC PANEL, COMPREHENSIVE; Future    2. Hypovitaminosis D  -     VITAMIN D, 25 HYDROXY; Future    3. Elevated hemoglobin A1c  -     HEMOGLOBIN A1C WITH EAG; Future    Other orders  -     METABOLIC PANEL, COMPREHENSIVE  -     LIPID PANEL  -     HEMOGLOBIN A1C WITH EAG  -     VITAMIN D, 25 HYDROXY        As above  Labs as ordered. Patient advised to adhere to a lower sodium, high potassium diet. We will continue to monitor. Follow-up and Dispositions    · Return in about 4 months (around 9/26/2021) for htn. This has been fully explained to the patient, who indicates understanding. An After Visit Summary was printed and given to the patient.            Tam Franco MD

## 2021-05-27 LAB
25(OH)D3+25(OH)D2 SERPL-MCNC: 50.4 NG/ML (ref 30–100)
ALBUMIN SERPL-MCNC: 4.6 G/DL (ref 3.6–4.6)
ALBUMIN/GLOB SERPL: 2.1 {RATIO} (ref 1.2–2.2)
ALP SERPL-CCNC: 83 IU/L (ref 48–121)
ALT SERPL-CCNC: 16 IU/L (ref 0–32)
AST SERPL-CCNC: 22 IU/L (ref 0–40)
BILIRUB SERPL-MCNC: 0.3 MG/DL (ref 0–1.2)
BUN SERPL-MCNC: 16 MG/DL (ref 8–27)
BUN/CREAT SERPL: 21 (ref 12–28)
CALCIUM SERPL-MCNC: 9.7 MG/DL (ref 8.7–10.3)
CHLORIDE SERPL-SCNC: 101 MMOL/L (ref 96–106)
CHOLEST SERPL-MCNC: 197 MG/DL (ref 100–199)
CO2 SERPL-SCNC: 25 MMOL/L (ref 20–29)
CREAT SERPL-MCNC: 0.78 MG/DL (ref 0.57–1)
EST. AVERAGE GLUCOSE BLD GHB EST-MCNC: 111 MG/DL
GLOBULIN SER CALC-MCNC: 2.2 G/DL (ref 1.5–4.5)
GLUCOSE SERPL-MCNC: 82 MG/DL (ref 65–99)
HBA1C MFR BLD: 5.5 % (ref 4.8–5.6)
HDLC SERPL-MCNC: 79 MG/DL
LDLC SERPL CALC-MCNC: 92 MG/DL (ref 0–99)
POTASSIUM SERPL-SCNC: 4.5 MMOL/L (ref 3.5–5.2)
PROT SERPL-MCNC: 6.8 G/DL (ref 6–8.5)
SODIUM SERPL-SCNC: 141 MMOL/L (ref 134–144)
TRIGL SERPL-MCNC: 155 MG/DL (ref 0–149)
VLDLC SERPL CALC-MCNC: 26 MG/DL (ref 5–40)

## 2021-07-19 ENCOUNTER — PATIENT MESSAGE (OUTPATIENT)
Dept: FAMILY MEDICINE CLINIC | Age: 81
End: 2021-07-19

## 2021-07-19 DIAGNOSIS — M79.603 UPPER EXTREMITY PAIN, INFERIOR, UNSPECIFIED LATERALITY: Primary | ICD-10-CM

## 2021-11-09 ENCOUNTER — OFFICE VISIT (OUTPATIENT)
Dept: FAMILY MEDICINE CLINIC | Age: 81
End: 2021-11-09
Payer: MEDICARE

## 2021-11-09 VITALS
RESPIRATION RATE: 18 BRPM | OXYGEN SATURATION: 97 % | HEIGHT: 60 IN | HEART RATE: 71 BPM | WEIGHT: 147.6 LBS | BODY MASS INDEX: 28.98 KG/M2 | SYSTOLIC BLOOD PRESSURE: 126 MMHG | TEMPERATURE: 97.9 F | DIASTOLIC BLOOD PRESSURE: 80 MMHG

## 2021-11-09 DIAGNOSIS — R73.09 ELEVATED HEMOGLOBIN A1C: ICD-10-CM

## 2021-11-09 DIAGNOSIS — E55.9 HYPOVITAMINOSIS D: ICD-10-CM

## 2021-11-09 DIAGNOSIS — Z00.00 MEDICARE ANNUAL WELLNESS VISIT, SUBSEQUENT: Primary | ICD-10-CM

## 2021-11-09 DIAGNOSIS — I10 PRIMARY HYPERTENSION: ICD-10-CM

## 2021-11-09 PROCEDURE — G8752 SYS BP LESS 140: HCPCS | Performed by: FAMILY MEDICINE

## 2021-11-09 PROCEDURE — G8510 SCR DEP NEG, NO PLAN REQD: HCPCS | Performed by: FAMILY MEDICINE

## 2021-11-09 PROCEDURE — G8419 CALC BMI OUT NRM PARAM NOF/U: HCPCS | Performed by: FAMILY MEDICINE

## 2021-11-09 PROCEDURE — G8754 DIAS BP LESS 90: HCPCS | Performed by: FAMILY MEDICINE

## 2021-11-09 PROCEDURE — G8427 DOCREV CUR MEDS BY ELIG CLIN: HCPCS | Performed by: FAMILY MEDICINE

## 2021-11-09 PROCEDURE — G8399 PT W/DXA RESULTS DOCUMENT: HCPCS | Performed by: FAMILY MEDICINE

## 2021-11-09 PROCEDURE — G0439 PPPS, SUBSEQ VISIT: HCPCS | Performed by: FAMILY MEDICINE

## 2021-11-09 PROCEDURE — G8536 NO DOC ELDER MAL SCRN: HCPCS | Performed by: FAMILY MEDICINE

## 2021-11-09 PROCEDURE — 1101F PT FALLS ASSESS-DOCD LE1/YR: CPT | Performed by: FAMILY MEDICINE

## 2021-11-09 NOTE — PATIENT INSTRUCTIONS
Medicare Wellness Visit, Female     The best way to live healthy is to have a lifestyle where you eat a well-balanced diet, exercise regularly, limit alcohol use, and quit all forms of tobacco/nicotine, if applicable. Regular preventive services are another way to keep healthy. Preventive services (vaccines, screening tests, monitoring & exams) can help personalize your care plan, which helps you manage your own care. Screening tests can find health problems at the earliest stages, when they are easiest to treat. [facility] follows the current, evidence-based guidelines published by the [facility]ram Chan ([facility]) when recommending preventive services for our patients. Because we follow these guidelines, sometimes recommendations change over time as research supports it. (For example, mammograms used to be recommended annually. Even though Medicare will still pay for an annual mammogram, the newer guidelines recommend a mammogram every two years for women of average risk). Of course, you and your doctor may decide to screen more often for some diseases, based on your risk and your co-morbidities (chronic disease you are already diagnosed with). Preventive services for you include:  - Medicare offers their members a free annual wellness visit, which is time for you and your primary care provider to discuss and plan for your preventive service needs. Take advantage of this benefit every year!  -All adults over the age of 72 should receive the recommended pneumonia vaccines. Current USPSTF guidelines recommend a series of two vaccines for the best pneumonia protection.   -All adults should have a flu vaccine yearly and a tetanus vaccine every 10 years.   -All adults age 48 and older should receive the shingles vaccines (series of two vaccines).       -All adults age 38-68 who are overweight should have a diabetes screening test once every three years.   -All adults born between [birth year] and 1967 should be screened once for Hepatitis C.  -Other screening tests and preventive services for persons with diabetes include: an eye exam to screen for diabetic retinopathy, a kidney function test, a foot exam, and stricter control over your cholesterol.   -Cardiovascular screening for adults with routine risk involves an electrocardiogram (ECG) at intervals determined by your doctor.   -Colorectal cancer screenings should be done for adults age 54-65 with no increased risk factors for colorectal cancer. There are a number of acceptable methods of screening for this type of cancer. Each test has its own benefits and drawbacks. Discuss with your doctor what is most appropriate for you during your annual wellness visit. The different tests include: colonoscopy (considered the best screening method), a fecal occult blood test, a fecal DNA test, and sigmoidoscopy.    -A bone mass density test is recommended when a woman turns 65 to screen for osteoporosis. This test is only recommended one time, as a screening. Some providers will use this same test as a disease monitoring tool if you already have osteoporosis. -Breast cancer screenings are recommended every other year for women of normal risk, age 54-69.  -Cervical cancer screenings for women over age 72 are only recommended with certain risk factors. Here is a list of your current Health Maintenance items (your personalized list of preventive services) with a due date: There are no preventive care reminders to display for this patient.

## 2021-11-09 NOTE — PROGRESS NOTES
This is the Subsequent Medicare Annual Wellness Exam, performed 12 months or more after the Initial AWV or the last Subsequent AWV    I have reviewed the patient's medical history in detail and updated the computerized patient record. BPs at home have been stable;   (see scan); blood pressure is stable here also at second check. She has been well. She is due for blood work to follow-up on her chronic conditions such as hypercholesterolemia, high blood pressure and vitamin D deficiency. Of note she has also had an elevated A1c in the past as well. We will follow this up as well. Assessment/Plan   Education and counseling provided:  Are appropriate based on today's review and evaluation  End-of-Life planning (with patient's consent)    1. Medicare annual wellness visit, subsequent  2. Primary hypertension  -     LIPID PANEL; Future  -     METABOLIC PANEL, COMPREHENSIVE; Future  3. Hypovitaminosis D  -     VITAMIN D, 25 HYDROXY; Future  4. Elevated hemoglobin A1c  -     HEMOGLOBIN A1C WITH EAG; Future  As above    Patient is stable clinically  Labs as ordered  Follow-up and Dispositions    · Return in about 5 months (around 4/9/2022). This has been fully explained to the patient, who indicates understanding. An After Visit Summary was printed and given to the patient. Depression Risk Factor Screening     3 most recent PHQ Screens 11/9/2021   Little interest or pleasure in doing things Not at all   Feeling down, depressed, irritable, or hopeless Not at all   Total Score PHQ 2 0       Alcohol Risk Screen    Do you average more than 1 drink per night or more than 7 drinks a week:  No    On any one occasion in the past three months have you have had more than 3 drinks containing alcohol:  No        Functional Ability and Level of Safety    Hearing: Hearing is good. The patient wears hearing aids. Activities of Daily Living:   The home contains: grab bars  Patient does total self care Ambulation: with mild difficulty     Fall Risk:  Fall Risk Assessment, last 12 mths 11/9/2021   Able to walk? Yes   Fall in past 12 months? 0   Do you feel unsteady? 0   Are you worried about falling 0      Abuse Screen:  Patient is not abused       Cognitive Screening    Has your family/caregiver stated any concerns about your memory: no     Cognitive Screening: cognition is intact; difficulty recalling names at times    Health Maintenance Due     There are no preventive care reminders to display for this patient.     Patient Care Team   Patient Care Team:  Judy Payne MD as PCP - General (Family Medicine)  Judy Payne MD as PCP - [address] [place] [address]  [facility]  Merlyn Gibson MD (Pulmonary Disease)    PE:  Visit Vitals  /80   Pulse 71   Temp 97.9 °F (36.6 °C) (Temporal)   Resp 18   Ht 5' (1.524 m)   Wt 147 lb 9.6 oz (67 kg)   SpO2 97%   BMI 28.83 kg/m²     General appearance: alert, cooperative, no distress, appears stated age  Neck: supple, symmetrical, trachea midline, no adenopathy, thyroid: not enlarged, symmetric, no tenderness/mass/nodules, no carotid bruit and no JVD  Lungs: clear to auscultation bilaterally  Heart: regular rate and rhythm, S1, S2 normal, no murmur, click, rub or gallop    Extremities: extremities normal, atraumatic, no cyanosis or edema    History     Patient Active Problem List   Diagnosis Code    HTN (hypertension) I10    Osteopenia M85.80    AR (allergic rhinitis) J30.9    Hyperlipidemia E78.5    RAD (reactive airway disease) J45.909    Low serum ferritin level R79.0    Chest pressure R07.89    Fatigue R53.83    Cough R05.9    DUBON (dyspnea on exertion) R06.00    Advance care planning Z71.89    Pulmonary nodules R91.8    Encounter for long-term (current) use of medications Z79.899    Medicare annual wellness visit, subsequent Z00.00    Screening for depression Z13.31    Hypovitaminosis D E55.9    Prediabetes R73.03    Pulmonary scarring J98.4 Past Medical History:   Diagnosis Date    Allergic rhinitis     AR (allergic rhinitis) 11/16/2011    Cataract     Chronic sinusitis     Hyperlipidaemia     Hyperlipidemia 11/16/2011    Hypertension     Mild Diastolic    Low ferritin level     Osteopenia 11/16/2011    Osteoporosis     Pneumonia     Sleep apnea     Vitamin D deficiencies     Mild      Past Surgical History:   Procedure Laterality Date    ENDOSCOPY, COLON, DIAGNOSTIC  12/4/01    PT DECLINED    AR ABDOMEN SURGERY PROC UNLISTED  1964    Hysterectomy     Current Outpatient Medications   Medication Sig Dispense Refill    simvastatin (ZOCOR) 20 mg tablet TAKE 1 TABLET EVERY NIGHT. 90 Tablet 0    amLODIPine (NORVASC) 5 mg tablet TAKE 1 TABLET EVERY DAY 90 Tab 3    latanoprost (XALATAN) 0.005 % ophthalmic solution Administer 1 Drop to both eyes nightly.  tiotropium-olodaterol (STIOLTO RESPIMAT) 2.5-2.5 mcg/actuation inhaler Take 2 Puffs by inhalation daily.  Colostrum/selen/gr t/Mush Cmb1 (COLOSTRUM-SE-GRTEA-MUSHROOMCMB PO) Take  by mouth daily.  cholecalciferol, VITAMIN D3, (VITAMIN D3) 5,000 unit tab tablet Take  by mouth daily.  albuterol (PROVENTIL HFA, VENTOLIN HFA, PROAIR HFA) 90 mcg/actuation inhaler Take 1 Puff by inhalation every four (4) hours as needed for Wheezing. 1 Inhaler 3    MULTIVIT-MIN/FA/LUTEIN/ZEAXANT (ICAPS MV PO) Take 1 Tab by mouth four (4) times daily.  glucosamine 1,000 mg Tab Take 1,000 mg by mouth daily.  calcium-vitamin D (CALCIUM 500+D) 500 mg(1,250mg) -200 unit per tablet Take 1 Tab by mouth two (2) times daily (with meals).  With magnesium        Allergies   Allergen Reactions    Augmentin [Amoxicillin-Pot Clavulanate] Diarrhea     severe    Sulfamerazine Diarrhea       Family History   Problem Relation Age of Onset    Hypertension Mother     Other Mother         [facility] Cancer Mother         Possibly Breast    COPD Father     Cancer Father         Some Form     Social History     Tobacco Use    Smoking status: Never Smoker    Smokeless tobacco: Never Used   Substance Use Topics    Alcohol use: Estella Phillips MD

## 2021-11-09 NOTE — PROGRESS NOTES
Ewa Bazan presents today for   Chief Complaint   Patient presents with    Hypertension     F/U       Is someone accompanying this pt? no    Is the patient using any DME equipment during OV? no    Depression Screening:  3 most recent PHQ Screens 11/9/2021   Little interest or pleasure in doing things Not at all   Feeling down, depressed, irritable, or hopeless Not at all   Total Score PHQ 2 0       Learning Assessment:  Learning Assessment 6/13/2018   PRIMARY LEARNER Patient   HIGHEST LEVEL OF EDUCATION - PRIMARY LEARNER  -   BARRIERS PRIMARY LEARNER -   CO-LEARNER CAREGIVER -   PRIMARY LANGUAGE ENGLISH   LEARNER PREFERENCE PRIMARY OTHER (COMMENT)   ANSWERED BY patient   RELATIONSHIP SELF       Abuse Screening:  Abuse Screening Questionnaire 11/9/2021   Do you ever feel afraid of your partner? N   Are you in a relationship with someone who physically or mentally threatens you? N   Is it safe for you to go home? Y       Fall Risk  Fall Risk Assessment, last 12 mths 11/9/2021   Able to walk? Yes   Fall in past 12 months? 0   Do you feel unsteady? 0       ADL  No flowsheet data found. Health Maintenance reviewed and discussed and ordered per Provider. Health Maintenance Due   Topic Date Due    COVID-19 Vaccine (1) Never done    Flu Vaccine (1) 09/01/2021    Medicare Yearly Exam  09/10/2021   . Coordination of Care:  1. Have you been to the ER, urgent care clinic since your last visit? Hospitalized since your last visit? no    2. Have you seen or consulted any other health care providers outside of the [address] [place] [address] since your last visit? Include any pap smears or colon screening. no    3. For patients aged 39-70: Has the patient had a colonoscopy? N/A     If the patient is female:    4. For patients aged 41-77: Has the patient had a mammogram within the past 2 years? N/A    5. For patients aged 21-65: Has the patient had a pap smear?  N/A

## 2021-11-10 LAB
25(OH)D3+25(OH)D2 SERPL-MCNC: 46.6 NG/ML (ref 30–100)
ALBUMIN SERPL-MCNC: 4.7 G/DL (ref 3.6–4.6)
ALBUMIN/GLOB SERPL: 2.1 {RATIO} (ref 1.2–2.2)
ALP SERPL-CCNC: 90 IU/L (ref 44–121)
ALT SERPL-CCNC: 15 IU/L (ref 0–32)
AST SERPL-CCNC: 19 IU/L (ref 0–40)
BILIRUB SERPL-MCNC: 0.3 MG/DL (ref 0–1.2)
BUN SERPL-MCNC: 16 MG/DL (ref 8–27)
BUN/CREAT SERPL: 18 (ref 12–28)
CALCIUM SERPL-MCNC: 9.5 MG/DL (ref 8.7–10.3)
CHLORIDE SERPL-SCNC: 103 MMOL/L (ref 96–106)
CHOLEST SERPL-MCNC: 192 MG/DL (ref 100–199)
CO2 SERPL-SCNC: 23 MMOL/L (ref 20–29)
CREAT SERPL-MCNC: 0.88 MG/DL (ref 0.57–1)
EST. AVERAGE GLUCOSE BLD GHB EST-MCNC: 111 MG/DL
GLOBULIN SER CALC-MCNC: 2.2 G/DL (ref 1.5–4.5)
GLUCOSE SERPL-MCNC: 90 MG/DL (ref 65–99)
HBA1C MFR BLD: 5.5 % (ref 4.8–5.6)
HDLC SERPL-MCNC: 78 MG/DL
LDLC SERPL CALC-MCNC: 88 MG/DL (ref 0–99)
POTASSIUM SERPL-SCNC: 4.2 MMOL/L (ref 3.5–5.2)
PROT SERPL-MCNC: 6.9 G/DL (ref 6–8.5)
SODIUM SERPL-SCNC: 143 MMOL/L (ref 134–144)
TRIGL SERPL-MCNC: 154 MG/DL (ref 0–149)
VLDLC SERPL CALC-MCNC: 26 MG/DL (ref 5–40)

## 2022-03-18 PROBLEM — R73.03 PREDIABETES: Status: ACTIVE | Noted: 2017-06-12

## 2022-03-18 PROBLEM — E55.9 HYPOVITAMINOSIS D: Status: ACTIVE | Noted: 2017-01-11

## 2022-03-20 PROBLEM — J98.4 PULMONARY SCARRING: Status: ACTIVE | Noted: 2020-05-12

## 2022-04-04 ENCOUNTER — OFFICE VISIT (OUTPATIENT)
Dept: FAMILY MEDICINE CLINIC | Age: 82
End: 2022-04-04
Payer: MEDICARE

## 2022-04-04 VITALS
OXYGEN SATURATION: 97 % | DIASTOLIC BLOOD PRESSURE: 60 MMHG | SYSTOLIC BLOOD PRESSURE: 132 MMHG | WEIGHT: 149 LBS | TEMPERATURE: 97.7 F | RESPIRATION RATE: 20 BRPM | HEART RATE: 71 BPM | HEIGHT: 60 IN | BODY MASS INDEX: 29.25 KG/M2

## 2022-04-04 DIAGNOSIS — E55.9 HYPOVITAMINOSIS D: ICD-10-CM

## 2022-04-04 DIAGNOSIS — I10 PRIMARY HYPERTENSION: ICD-10-CM

## 2022-04-04 DIAGNOSIS — R73.09 ELEVATED HEMOGLOBIN A1C: ICD-10-CM

## 2022-04-04 DIAGNOSIS — E78.00 PURE HYPERCHOLESTEROLEMIA: Primary | ICD-10-CM

## 2022-04-04 PROCEDURE — G8752 SYS BP LESS 140: HCPCS | Performed by: FAMILY MEDICINE

## 2022-04-04 PROCEDURE — G8510 SCR DEP NEG, NO PLAN REQD: HCPCS | Performed by: FAMILY MEDICINE

## 2022-04-04 PROCEDURE — G8754 DIAS BP LESS 90: HCPCS | Performed by: FAMILY MEDICINE

## 2022-04-04 PROCEDURE — G8427 DOCREV CUR MEDS BY ELIG CLIN: HCPCS | Performed by: FAMILY MEDICINE

## 2022-04-04 PROCEDURE — 99214 OFFICE O/P EST MOD 30 MIN: CPT | Performed by: FAMILY MEDICINE

## 2022-04-04 PROCEDURE — G8419 CALC BMI OUT NRM PARAM NOF/U: HCPCS | Performed by: FAMILY MEDICINE

## 2022-04-04 PROCEDURE — 1090F PRES/ABSN URINE INCON ASSESS: CPT | Performed by: FAMILY MEDICINE

## 2022-04-04 PROCEDURE — 1101F PT FALLS ASSESS-DOCD LE1/YR: CPT | Performed by: FAMILY MEDICINE

## 2022-04-04 PROCEDURE — G8536 NO DOC ELDER MAL SCRN: HCPCS | Performed by: FAMILY MEDICINE

## 2022-04-04 PROCEDURE — G8399 PT W/DXA RESULTS DOCUMENT: HCPCS | Performed by: FAMILY MEDICINE

## 2022-04-04 NOTE — PROGRESS NOTES
HPI:  Pam Maldonado is a 78 y.o. female who presents today with   Chief Complaint   Patient presents with    Hypertension      Pt has HTN:she is on norvasc. 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 RAD has been more active recently. She is on stiolto. This has been helpful. Pt stays active; walks daily. Has no LE pain; Had some previous left shoulder pain that has since resolved. Pt is due for blood work; Pt has a h/o vitamin D deficiency and prediabetes. She does note some weight gain. She has her meals prepared for her where she lives. Lab Results   Component Value Date/Time    Cholesterol, total 192 11/09/2021 03:47 PM    HDL Cholesterol 78 11/09/2021 03:47 PM    LDL, calculated 88 11/09/2021 03:47 PM    LDL, calculated 79 08/19/2020 09:15 AM    VLDL, calculated 26 11/09/2021 03:47 PM    VLDL, calculated 24 08/19/2020 09:15 AM    Triglyceride 154 (H) 11/09/2021 03:47 PM    CHOL/HDL Ratio 3.1 11/02/2010 08:00 AM     Lab Results   Component Value Date/Time    Sodium 143 11/09/2021 03:47 PM    Potassium 4.2 11/09/2021 03:47 PM    Chloride 103 11/09/2021 03:47 PM    CO2 23 11/09/2021 03:47 PM    Anion gap 4 06/16/2015 10:15 AM    Glucose 90 11/09/2021 03:47 PM    BUN 16 11/09/2021 03:47 PM    Creatinine 0.88 11/09/2021 03:47 PM    BUN/Creatinine ratio 18 11/09/2021 03:47 PM    GFR est AA 70 11/09/2021 03:47 PM    GFR est non-AA 61 11/09/2021 03:47 PM    Calcium 9.5 11/09/2021 03:47 PM    Bilirubin, total 0.3 11/09/2021 03:47 PM    Alk.  phosphatase 90 11/09/2021 03:47 PM    Protein, total 6.9 11/09/2021 03:47 PM    Albumin 4.7 (H) 11/09/2021 03:47 PM    Globulin 3.6 06/16/2015 10:15 AM    A-G Ratio 2.1 11/09/2021 03:47 PM    ALT (SGPT) 15 11/09/2021 03:47 PM    AST (SGOT) 19 11/09/2021 03:47 PM             3 most recent PHQ Screens 4/4/2022   Little interest or pleasure in doing things Not at all   Feeling down, depressed, irritable, or hopeless Not at all   Total Score PHQ 2 0               PMH,  Meds, Allergies, Family History, Social history reviewed      Current Outpatient Medications   Medication Sig Dispense Refill    amLODIPine (NORVASC) 5 mg tablet TAKE 1 TABLET EVERY DAY 90 Tablet 3    simvastatin (ZOCOR) 20 mg tablet TAKE 1 TABLET EVERY NIGHT. 90 Tablet 3    latanoprost (XALATAN) 0.005 % ophthalmic solution Administer 1 Drop to both eyes nightly.  tiotropium-olodaterol (STIOLTO RESPIMAT) 2.5-2.5 mcg/actuation inhaler Take 2 Puffs by inhalation daily.  Colostrum/selen/gr t/Mush Cmb1 (COLOSTRUM-SE-GRTEA-MUSHROOMCMB PO) Take  by mouth daily.  cholecalciferol, VITAMIN D3, (VITAMIN D3) 5,000 unit tab tablet Take  by mouth daily.  albuterol (PROVENTIL HFA, VENTOLIN HFA, PROAIR HFA) 90 mcg/actuation inhaler Take 1 Puff by inhalation every four (4) hours as needed for Wheezing. 1 Inhaler 3    MULTIVIT-MIN/FA/LUTEIN/ZEAXANT (ICAPS MV PO) Take 1 Tab by mouth four (4) times daily.  glucosamine 1,000 mg Tab Take 1,000 mg by mouth daily.  calcium-vitamin D (CALCIUM 500+D) 500 mg(1,250mg) -200 unit per tablet Take 1 Tab by mouth two (2) times daily (with meals).  With magnesium           Allergies   Allergen Reactions    Augmentin [Amoxicillin-Pot Clavulanate] Diarrhea     severe    Sulfamerazine Diarrhea                  ROS as per HPI      Visit Vitals  /60   Pulse 71   Temp 97.7 °F (36.5 °C) (Temporal)   Resp 20   Ht 5' (1.524 m)   Wt 149 lb (67.6 kg)   SpO2 97%   BMI 29.10 kg/m²     Physical Exam   General appearance: alert, cooperative, no distress, appears stated age  Neck: supple, symmetrical, trachea midline, no adenopathy, thyroid: not enlarged, symmetric, no tenderness/mass/nodules, no carotid bruit and no JVD  Lungs: clear to auscultation bilaterally  Heart: regular rate and rhythm, S1, S2 normal, no murmur, click, rub or gallop    Extremities: extremities normal, atraumatic, no cyanosis or edema        Assessment/Plan:  Diagnoses and all orders for this visit:    1. Pure hypercholesterolemia  -     LIPID PANEL; Future    2. Primary hypertension  -     METABOLIC PANEL, COMPREHENSIVE; Future    3. Hypovitaminosis D  -     VITAMIN D, 25 HYDROXY; Future    4. Elevated hemoglobin A1c  -     HEMOGLOBIN A1C WITH EAG; Future        As above  Pt stable   treatment plan as listed below  Orders Placed This Encounter    LIPID PANEL    METABOLIC PANEL, COMPREHENSIVE    VITAMIN D, 25 HYDROXY    HEMOGLOBIN A1C WITH EAG     Follow-up and Dispositions    · Return in about 4 months (around 8/4/2022) for htn. This has been fully explained to the patient, who indicates understanding. An After Visit Summary was printed and given to the patient.         Flor Bautista MD

## 2022-04-04 NOTE — PATIENT INSTRUCTIONS
DASH Diet: Care Instructions  Your Care Instructions     The DASH diet is an eating plan that can help lower your blood pressure. DASH stands for Dietary Approaches to Stop Hypertension. Hypertension is high blood pressure. The DASH diet focuses on eating foods that are high in calcium, potassium, and magnesium. These nutrients can lower blood pressure. The foods that are highest in these nutrients are fruits, vegetables, low-fat dairy products, nuts, seeds, and legumes. But taking calcium, potassium, and magnesium supplements instead of eating foods that are high in those nutrients does not have the same effect. The DASH diet also includes whole grains, fish, and poultry. The DASH diet is one of several lifestyle changes your doctor may recommend to lower your high blood pressure. Your doctor may also want you to decrease the amount of sodium in your diet. Lowering sodium while following the DASH diet can lower blood pressure even further than just the DASH diet alone. Follow-up care is a key part of your treatment and safety. Be sure to make and go to all appointments, and call your doctor if you are having problems. It's also a good idea to know your test results and keep a list of the medicines you take. How can you care for yourself at home? Following the DASH diet  · Eat 4 to 5 servings of fruit each day. A serving is 1 medium-sized piece of fruit, ½ cup chopped or canned fruit, 1/4 cup dried fruit, or 4 ounces (½ cup) of fruit juice. Choose fruit more often than fruit juice. · Eat 4 to 5 servings of vegetables each day. A serving is 1 cup of lettuce or raw leafy vegetables, ½ cup of chopped or cooked vegetables, or 4 ounces (½ cup) of vegetable juice. Choose vegetables more often than vegetable juice. · Get 2 to 3 servings of low-fat and fat-free dairy each day. A serving is 8 ounces of milk, 1 cup of yogurt, or 1 ½ ounces of cheese. · Eat 6 to 8 servings of grains each day.  A serving is 1 slice of bread, 1 ounce of dry cereal, or ½ cup of cooked rice, pasta, or cooked cereal. Try to choose whole-grain products as much as possible. · Limit lean meat, poultry, and fish to 2 servings each day. A serving is 3 ounces, about the size of a deck of cards. · Eat 4 to 5 servings of nuts, seeds, and legumes (cooked dried beans, lentils, and split peas) each week. A serving is 1/3 cup of nuts, 2 tablespoons of seeds, or ½ cup of cooked beans or peas. · Limit fats and oils to 2 to 3 servings each day. A serving is 1 teaspoon of vegetable oil or 2 tablespoons of salad dressing. · Limit sweets and added sugars to 5 servings or less a week. A serving is 1 tablespoon jelly or jam, ½ cup sorbet, or 1 cup of lemonade. · Eat less than 2,300 milligrams (mg) of sodium a day. If you limit your sodium to 1,500 mg a day, you can lower your blood pressure even more. · Be aware that all of these are the suggested number of servings for people who eat 1,800 to 2,000 calories a day. Your recommended number of servings may be different if you need more or fewer calories. Tips for success  · Start small. Do not try to make dramatic changes to your diet all at once. You might feel that you are missing out on your favorite foods and then be more likely to not follow the plan. Make small changes, and stick with them. Once those changes become habit, add a few more changes. · Try some of the following:  ? Make it a goal to eat a fruit or vegetable at every meal and at snacks. This will make it easy to get the recommended amount of fruits and vegetables each day. ? Try yogurt topped with fruit and nuts for a snack or healthy dessert. ? Add lettuce, tomato, cucumber, and onion to sandwiches. ? Combine a ready-made pizza crust with low-fat mozzarella cheese and lots of vegetable toppings. Try using tomatoes, squash, spinach, broccoli, carrots, cauliflower, and onions. ?  Have a variety of cut-up vegetables with a low-fat dip as an appetizer instead of chips and dip. ? Sprinkle sunflower seeds or chopped almonds over salads. Or try adding chopped walnuts or almonds to cooked vegetables. ? Try some vegetarian meals using beans and peas. Add garbanzo or kidney beans to salads. Make burritos and tacos with mashed francis beans or black beans. Where can you learn more? Go to http://www.gonsalves.com/  Enter H967 in the search box to learn more about \"DASH Diet: Care Instructions. \"  Current as of: January 10, 2022               Content Version: 13.2  © 6026-7058 [employer]. Care instructions adapted under license by [employer] (which disclaims liability or warranty for this information). If you have questions about a medical condition or this instruction, always ask your healthcare professional. [address] any warranty or liability for your use of this information.

## 2022-04-04 NOTE — PROGRESS NOTES
1. \"Have you been to the ER, urgent care clinic since your last visit? Hospitalized since your last visit? \" No    2. \"Have you seen or consulted any other health care providers outside of the [address] [place] [address] since your last visit? \" No     3. For patients aged 39-70: Has the patient had a colonoscopy / FIT/ Cologuard? No      If the patient is female:    4. For patients aged 41-77: Has the patient had a mammogram within the past 2 years? No      5. For patients aged 21-65: Has the patient had a pap smear?  No

## 2022-04-06 ENCOUNTER — LAB ONLY (OUTPATIENT)
Dept: FAMILY MEDICINE CLINIC | Age: 82
End: 2022-04-06

## 2022-04-06 DIAGNOSIS — E55.9 HYPOVITAMINOSIS D: ICD-10-CM

## 2022-04-06 DIAGNOSIS — I10 PRIMARY HYPERTENSION: ICD-10-CM

## 2022-04-06 DIAGNOSIS — E78.00 PURE HYPERCHOLESTEROLEMIA: ICD-10-CM

## 2022-04-06 DIAGNOSIS — R73.09 ELEVATED HEMOGLOBIN A1C: ICD-10-CM

## 2022-04-07 LAB
25(OH)D3+25(OH)D2 SERPL-MCNC: 51 NG/ML (ref 30–100)
ALBUMIN SERPL-MCNC: 4.6 G/DL (ref 3.6–4.6)
ALBUMIN/GLOB SERPL: 2.1 {RATIO} (ref 1.2–2.2)
ALP SERPL-CCNC: 79 IU/L (ref 44–121)
ALT SERPL-CCNC: 18 IU/L (ref 0–32)
AST SERPL-CCNC: 20 IU/L (ref 0–40)
BILIRUB SERPL-MCNC: 0.4 MG/DL (ref 0–1.2)
BUN SERPL-MCNC: 12 MG/DL (ref 8–27)
BUN/CREAT SERPL: 16 (ref 12–28)
CALCIUM SERPL-MCNC: 8.8 MG/DL (ref 8.7–10.3)
CHLORIDE SERPL-SCNC: 103 MMOL/L (ref 96–106)
CHOLEST SERPL-MCNC: 172 MG/DL (ref 100–199)
CO2 SERPL-SCNC: 23 MMOL/L (ref 20–29)
CREAT SERPL-MCNC: 0.77 MG/DL (ref 0.57–1)
EGFR: 76 ML/MIN/1.73
EST. AVERAGE GLUCOSE BLD GHB EST-MCNC: 108 MG/DL
GLOBULIN SER CALC-MCNC: 2.2 G/DL (ref 1.5–4.5)
GLUCOSE SERPL-MCNC: 84 MG/DL (ref 65–99)
HBA1C MFR BLD: 5.4 % (ref 4.8–5.6)
HDLC SERPL-MCNC: 80 MG/DL
LDLC SERPL CALC-MCNC: 73 MG/DL (ref 0–99)
POTASSIUM SERPL-SCNC: 4.5 MMOL/L (ref 3.5–5.2)
PROT SERPL-MCNC: 6.8 G/DL (ref 6–8.5)
SODIUM SERPL-SCNC: 143 MMOL/L (ref 134–144)
TRIGL SERPL-MCNC: 107 MG/DL (ref 0–149)
VLDLC SERPL CALC-MCNC: 19 MG/DL (ref 5–40)

## 2022-08-04 ENCOUNTER — OFFICE VISIT (OUTPATIENT)
Dept: FAMILY MEDICINE CLINIC | Age: 82
End: 2022-08-04
Payer: MEDICARE

## 2022-08-04 DIAGNOSIS — E55.9 HYPOVITAMINOSIS D: ICD-10-CM

## 2022-08-04 DIAGNOSIS — I10 PRIMARY HYPERTENSION: ICD-10-CM

## 2022-08-04 DIAGNOSIS — E78.00 PURE HYPERCHOLESTEROLEMIA: Primary | ICD-10-CM

## 2022-08-04 DIAGNOSIS — R73.09 ELEVATED HEMOGLOBIN A1C: ICD-10-CM

## 2022-08-04 PROCEDURE — 1101F PT FALLS ASSESS-DOCD LE1/YR: CPT | Performed by: FAMILY MEDICINE

## 2022-08-04 PROCEDURE — 1123F ACP DISCUSS/DSCN MKR DOCD: CPT | Performed by: FAMILY MEDICINE

## 2022-08-04 PROCEDURE — G8754 DIAS BP LESS 90: HCPCS | Performed by: FAMILY MEDICINE

## 2022-08-04 PROCEDURE — G8417 CALC BMI ABV UP PARAM F/U: HCPCS | Performed by: FAMILY MEDICINE

## 2022-08-04 PROCEDURE — G8752 SYS BP LESS 140: HCPCS | Performed by: FAMILY MEDICINE

## 2022-08-04 PROCEDURE — G8427 DOCREV CUR MEDS BY ELIG CLIN: HCPCS | Performed by: FAMILY MEDICINE

## 2022-08-04 PROCEDURE — 1090F PRES/ABSN URINE INCON ASSESS: CPT | Performed by: FAMILY MEDICINE

## 2022-08-04 PROCEDURE — 99213 OFFICE O/P EST LOW 20 MIN: CPT | Performed by: FAMILY MEDICINE

## 2022-08-04 PROCEDURE — G8399 PT W/DXA RESULTS DOCUMENT: HCPCS | Performed by: FAMILY MEDICINE

## 2022-08-04 PROCEDURE — G8510 SCR DEP NEG, NO PLAN REQD: HCPCS | Performed by: FAMILY MEDICINE

## 2022-08-04 PROCEDURE — G8536 NO DOC ELDER MAL SCRN: HCPCS | Performed by: FAMILY MEDICINE

## 2022-08-04 NOTE — PROGRESS NOTES
1. \"Have you been to the ER, urgent care clinic since your last visit? Hospitalized since your last visit? \" No    2. \"Have you seen or consulted any other health care providers outside of the [address] [place] [address] since your last visit? \"  Yes, Pulmonary with [facility]       3. For patients aged 39-70: Has the patient had a colonoscopy / FIT/ Cologuard? NA - based on age      If the patient is female:    4. For patients aged 41-77: Has the patient had a mammogram within the past 2 years? NA - based on age or sex      11. For patients aged 21-65: Has the patient had a pap smear?  NA - based on age or sex

## 2022-08-04 NOTE — PROGRESS NOTES
HPI:  Michel Lopez is a 78 y.o. female who presents today with   Chief Complaint   Patient presents with    Hypertension     4 m f/u      Patient is here to follow-up on high blood pressure. Blood pressure is stable today. She  is on medications as listed below. No refills are needed  Patient stays active, interactive with her neighbors    Patient has also had low vitamin D, an elevated A1c, and hypercholesterolemia. 3 most recent PHQ Screens 8/4/2022   PHQ Not Done Patient refuses   Little interest or pleasure in doing things Not at all   Feeling down, depressed, irritable, or hopeless Not at all   Total Score PHQ 2 0               PMH,  Meds, Allergies, Family History, Social history reviewed      Current Outpatient Medications   Medication Sig Dispense Refill    mecobalamin (B12 ACTIVE PO) Take  by mouth. amLODIPine (NORVASC) 5 mg tablet TAKE 1 TABLET EVERY DAY 90 Tablet 3    simvastatin (ZOCOR) 20 mg tablet TAKE 1 TABLET EVERY NIGHT. 90 Tablet 3    latanoprost (XALATAN) 0.005 % ophthalmic solution Administer 1 Drop to both eyes nightly. tiotropium-olodaterol (STIOLTO RESPIMAT) 2.5-2.5 mcg/actuation inhaler Take 2 Puffs by inhalation daily. cholecalciferol, VITAMIN D3, (VITAMIN D3) 5,000 unit tab tablet Take  by mouth daily. albuterol (PROVENTIL HFA, VENTOLIN HFA, PROAIR HFA) 90 mcg/actuation inhaler Take 1 Puff by inhalation every four (4) hours as needed for Wheezing. 1 Inhaler 3    MULTIVIT-MIN/FA/LUTEIN/ZEAXANT (ICAPS MV PO) Take 1 Tab by mouth four (4) times daily. calcium-vitamin D (OS-[place] +D3) 500 mg-200 unit per tablet Take 1 Tab by mouth two (2) times daily (with meals).  With magnesium           Allergies   Allergen Reactions    Augmentin [Amoxicillin-Pot Clavulanate] Diarrhea     severe    Sulfamerazine Diarrhea                  ROS as per HPI      Visit Vitals  /70   Pulse 71   Temp 96.8 °F (36 °C) (Temporal)   Resp 12   Ht 5' (1.524 m)   Wt 144 lb 6.4 oz (65.5 kg)   SpO2 94%   BMI 28.20 kg/m²     Physical Exam    General appearance: alert, cooperative, no distress, appears stated age  Neck: supple, symmetrical, trachea midline, no adenopathy, thyroid: not enlarged, symmetric, no tenderness/mass/nodules, no carotid bruit and no JVD  Lungs: clear to auscultation bilaterally  Heart: regular rate and rhythm, S1, S2 normal, no murmur, click, rub or gallop  Extremities: extremities normal, atraumatic, no cyanosis or edema    Assessment/Plan:    Diagnoses and all orders for this visit:    1. Pure hypercholesterolemia    2. Primary hypertension    3. Hypovitaminosis D    4. Elevated hemoglobin A1c        As above  Patient stable   treatment plan as listed below  Orders Placed This Encounter    mecobalamin (B12 ACTIVE PO)     Meds reconciled  We will plan for lab work at follow-up. An After Visit Summary was printed and given to the patient. This has been fully explained to the patient, who indicates understanding. Follow-up and Dispositions    Return in about 3 months (around 11/4/2022) for medicare well.             Alice Kirk MD

## 2022-08-04 NOTE — PATIENT INSTRUCTIONS
DASH Diet: Care Instructions  Your Care Instructions     The DASH diet is an eating plan that can help lower your blood pressure. DASH stands for Dietary Approaches to Stop Hypertension. Hypertension is high blood pressure. The DASH diet focuses on eating foods that are high in calcium, potassium, and magnesium. These nutrients can lower blood pressure. The foods that are highest in these nutrients are fruits, vegetables, low-fat dairy products, nuts, seeds, and legumes. But taking calcium, potassium, and magnesium supplements instead of eating foods that are high in those nutrients does not have the same effect. The DASH diet also includes whole grains, fish, and poultry. The DASH diet is one of several lifestyle changes your doctor may recommend to lower your high blood pressure. Your doctor may also want you to decrease the amount of sodium in your diet. Lowering sodium while following the DASH diet can lower blood pressure even further than just the DASH diet alone. Follow-up care is a key part of your treatment and safety. Be sure to make and go to all appointments, and call your doctor if you are having problems. It's also a good idea to know your test results and keep a list of the medicines you take. How can you care for yourself at home? Following the DASH diet  Eat 4 to 5 servings of fruit each day. A serving is 1 medium-sized piece of fruit, ½ cup chopped or canned fruit, 1/4 cup dried fruit, or 4 ounces (½ cup) of fruit juice. Choose fruit more often than fruit juice. Eat 4 to 5 servings of vegetables each day. A serving is 1 cup of lettuce or raw leafy vegetables, ½ cup of chopped or cooked vegetables, or 4 ounces (½ cup) of vegetable juice. Choose vegetables more often than vegetable juice. Get 2 to 3 servings of low-fat and fat-free dairy each day. A serving is 8 ounces of milk, 1 cup of yogurt, or 1 ½ ounces of cheese. Eat 6 to 8 servings of grains each day.  A serving is 1 slice of bread, 1 ounce of dry cereal, or ½ cup of cooked rice, pasta, or cooked cereal. Try to choose whole-grain products as much as possible. Limit lean meat, poultry, and fish to 2 servings each day. A serving is 3 ounces, about the size of a deck of cards. Eat 4 to 5 servings of nuts, seeds, and legumes (cooked dried beans, lentils, and split peas) each week. A serving is 1/3 cup of nuts, 2 tablespoons of seeds, or ½ cup of cooked beans or peas. Limit fats and oils to 2 to 3 servings each day. A serving is 1 teaspoon of vegetable oil or 2 tablespoons of salad dressing. Limit sweets and added sugars to 5 servings or less a week. A serving is 1 tablespoon jelly or jam, ½ cup sorbet, or 1 cup of lemonade. Eat less than 2,300 milligrams (mg) of sodium a day. If you limit your sodium to 1,500 mg a day, you can lower your blood pressure even more. Be aware that all of these are the suggested number of servings for people who eat 1,800 to 2,000 calories a day. Your recommended number of servings may be different if you need more or fewer calories. Tips for success  Start small. Do not try to make dramatic changes to your diet all at once. You might feel that you are missing out on your favorite foods and then be more likely to not follow the plan. Make small changes, and stick with them. Once those changes become habit, add a few more changes. Try some of the following:  Make it a goal to eat a fruit or vegetable at every meal and at snacks. This will make it easy to get the recommended amount of fruits and vegetables each day. Try yogurt topped with fruit and nuts for a snack or healthy dessert. Add lettuce, tomato, cucumber, and onion to sandwiches. Combine a ready-made pizza crust with low-fat mozzarella cheese and lots of vegetable toppings. Try using tomatoes, squash, spinach, broccoli, carrots, cauliflower, and onions.   Have a variety of cut-up vegetables with a low-fat dip as an appetizer instead of chips and dip.  Sprinkle sunflower seeds or chopped almonds over salads. Or try adding chopped walnuts or almonds to cooked vegetables. Try some vegetarian meals using beans and peas. Add garbanzo or kidney beans to salads. Make burritos and tacos with mashed francis beans or black beans. Where can you learn more? Go to http://www.gonsalves.com/  Enter H967 in the search box to learn more about \"DASH Diet: Care Instructions. \"  Current as of: January 10, 2022               Content Version: 13.2  © 2579-7662 [employer]. Care instructions adapted under license by [employer] (which disclaims liability or warranty for this information). If you have questions about a medical condition or this instruction, always ask your healthcare professional. [address] any warranty or liability for your use of this information.

## 2022-08-08 VITALS
SYSTOLIC BLOOD PRESSURE: 130 MMHG | DIASTOLIC BLOOD PRESSURE: 70 MMHG | BODY MASS INDEX: 24.06 KG/M2 | RESPIRATION RATE: 12 BRPM | WEIGHT: 144.4 LBS | TEMPERATURE: 96.8 F | HEART RATE: 71 BPM | HEIGHT: 65 IN | OXYGEN SATURATION: 94 %

## 2022-11-07 ENCOUNTER — OFFICE VISIT (OUTPATIENT)
Dept: FAMILY MEDICINE CLINIC | Age: 82
End: 2022-11-07
Payer: MEDICARE

## 2022-11-07 VITALS
BODY MASS INDEX: 24.22 KG/M2 | HEART RATE: 72 BPM | OXYGEN SATURATION: 96 % | SYSTOLIC BLOOD PRESSURE: 138 MMHG | WEIGHT: 145.4 LBS | DIASTOLIC BLOOD PRESSURE: 71 MMHG | TEMPERATURE: 96.8 F | HEIGHT: 65 IN | RESPIRATION RATE: 12 BRPM

## 2022-11-07 DIAGNOSIS — Z00.00 MEDICARE ANNUAL WELLNESS VISIT, SUBSEQUENT: Primary | ICD-10-CM

## 2022-11-07 PROCEDURE — G8427 DOCREV CUR MEDS BY ELIG CLIN: HCPCS | Performed by: FAMILY MEDICINE

## 2022-11-07 PROCEDURE — G8399 PT W/DXA RESULTS DOCUMENT: HCPCS | Performed by: FAMILY MEDICINE

## 2022-11-07 PROCEDURE — G8420 CALC BMI NORM PARAMETERS: HCPCS | Performed by: FAMILY MEDICINE

## 2022-11-07 PROCEDURE — G8536 NO DOC ELDER MAL SCRN: HCPCS | Performed by: FAMILY MEDICINE

## 2022-11-07 PROCEDURE — G8510 SCR DEP NEG, NO PLAN REQD: HCPCS | Performed by: FAMILY MEDICINE

## 2022-11-07 PROCEDURE — 1123F ACP DISCUSS/DSCN MKR DOCD: CPT | Performed by: FAMILY MEDICINE

## 2022-11-07 PROCEDURE — G0439 PPPS, SUBSEQ VISIT: HCPCS | Performed by: FAMILY MEDICINE

## 2022-11-07 PROCEDURE — G8752 SYS BP LESS 140: HCPCS | Performed by: FAMILY MEDICINE

## 2022-11-07 PROCEDURE — G8754 DIAS BP LESS 90: HCPCS | Performed by: FAMILY MEDICINE

## 2022-11-07 PROCEDURE — 1101F PT FALLS ASSESS-DOCD LE1/YR: CPT | Performed by: FAMILY MEDICINE

## 2022-11-07 PROCEDURE — 3078F DIAST BP <80 MM HG: CPT | Performed by: FAMILY MEDICINE

## 2022-11-07 PROCEDURE — 3074F SYST BP LT 130 MM HG: CPT | Performed by: FAMILY MEDICINE

## 2022-11-07 NOTE — PROGRESS NOTES
This is the Subsequent Medicare Annual Wellness Exam, performed 12 months or more after the Initial AWV or the last Subsequent AWV    I have reviewed the patient's medical history in detail and updated the computerized patient record. Patient is here to follow-up. Patient has been feeling well  She has no new complaints  She physically has been quite active still. She does not need any refills today. Assessment/Plan   Education and counseling provided:  Are appropriate based on today's review and evaluation  End-of-Life planning (with patient's consent)    1. Medicare annual wellness visit, subsequent     As above  Patient stable  Will get labs at March visit as phlebotomy services are not available today  Follow-up and Dispositions    Return in about 4 months (around 3/7/2023) for htn. This has been fully explained to the patient, who indicates understanding. An After Visit Summary was printed and given to the patient. Depression Risk Factor Screening     3 most recent PHQ Screens 11/7/2022   PHQ Not Done -   Little interest or pleasure in doing things Not at all   Feeling down, depressed, irritable, or hopeless Not at all   Total Score PHQ 2 0       Alcohol & Drug Abuse Risk Screen    Do you average more than 1 drink per night or more than 7 drinks a week:  No    On any one occasion in the past three months have you have had more than 3 drinks containing alcohol:  No          Functional Ability and Level of Safety    Hearing: Hearing is good. Activities of Daily Living: The home contains:  Grab bars and a bathroom alarm cord  Patient does total self care      Ambulation: with no difficulty     Fall Risk:  Fall Risk Assessment, last 12 mths 11/7/2022   Able to walk? Yes   Fall in past 12 months? 0   Do you feel unsteady?  0   Are you worried about falling 0      Abuse Screen:  Patient is not abused       Cognitive Screening    Has your family/caregiver stated any concerns about your memory: no     Cognitive Screening: Normal -cognition is intact    Health Maintenance Due     Health Maintenance Due   Topic Date Due    COVID-19 Vaccine (4 - Booster for Westly Poisson series) 12/21/2021       Patient Care Team   Patient Care Team:  Betzaida Jarvis MD as PCP - General (Family Medicine)  Betzaida Jarvis MD as PCP - REHABILITATION [facility] Empaneled Provider  Maricarmen Shaw MD (Pulmonary Disease)      PE:  Visit Vitals  /71 (BP 1 Location: Right arm, BP Patient Position: Sitting, BP Cuff Size: Adult)   Pulse 72   Temp 96.8 °F (36 °C) (Temporal)   Resp 12   Ht 5' 5\" (1.651 m)   Wt 145 lb 6.4 oz (66 kg)   SpO2 96%   BMI 24.20 kg/m²     General appearance: alert, cooperative, no distress, appears stated age  Neck: supple, symmetrical, trachea midline, no adenopathy, thyroid: not enlarged, symmetric, no tenderness/mass/nodules, no carotid bruit and no JVD  Lungs: clear to auscultation bilaterally  Heart: regular rate and rhythm, S1, S2 normal, no murmur, click, rub or gallop    Extremities: extremities normal, atraumatic, no cyanosis or edema    Lab Results   Component Value Date/Time    Cholesterol, total 172 04/06/2022 08:49 AM    HDL Cholesterol 80 04/06/2022 08:49 AM    LDL, calculated 73 04/06/2022 08:49 AM    LDL, calculated 79 08/19/2020 09:15 AM    VLDL, calculated 19 04/06/2022 08:49 AM    VLDL, calculated 24 08/19/2020 09:15 AM    Triglyceride 107 04/06/2022 08:49 AM    CHOL/HDL Ratio 3.1 11/02/2010 08:00 AM     Lab Results   Component Value Date/Time    Sodium 143 04/06/2022 08:49 AM    Potassium 4.5 04/06/2022 08:49 AM    Chloride 103 04/06/2022 08:49 AM    CO2 23 04/06/2022 08:49 AM    Anion gap 4 06/16/2015 10:15 AM    Glucose 84 04/06/2022 08:49 AM    BUN 12 04/06/2022 08:49 AM    Creatinine 0.77 04/06/2022 08:49 AM    BUN/Creatinine ratio 16 04/06/2022 08:49 AM    GFR est AA 70 11/09/2021 03:47 PM    GFR est non-AA 61 11/09/2021 03:47 PM    Calcium 8.8 04/06/2022 08:49 AM    Bilirubin, total 0.4 04/06/2022 08:49 AM    Alk. phosphatase 79 04/06/2022 08:49 AM    Protein, total 6.8 04/06/2022 08:49 AM    Albumin 4.6 04/06/2022 08:49 AM    Globulin 3.6 06/16/2015 10:15 AM    A-G Ratio 2.1 04/06/2022 08:49 AM    ALT (SGPT) 18 04/06/2022 08:49 AM    AST (SGOT) 20 04/06/2022 08:49 AM     Lab Results   Component Value Date/Time    Hemoglobin A1c 5.4 04/06/2022 08:49 AM         History     Patient Active Problem List   Diagnosis Code    HTN (hypertension) I10    Osteopenia M85.80    AR (allergic rhinitis) J30.9    Hyperlipidemia E78.5    RAD (reactive airway disease) J45.909    Low serum ferritin level R79.0    Chest pressure R07.89    Fatigue R53.83    Cough R05.9    DUBON (dyspnea on exertion) R06.09    Advance care planning Z71.89    Pulmonary nodules R91.8    Encounter for long-term (current) use of medications Z79.899    Medicare annual wellness visit, subsequent Z00.00    Screening for depression Z13.31    Hypovitaminosis D E55.9    Prediabetes R73.03    Pulmonary scarring J98.4     Past Medical History:   Diagnosis Date    Allergic rhinitis     AR (allergic rhinitis) 11/16/2011    Cataract     Chronic sinusitis     Hyperlipidaemia     Hyperlipidemia 11/16/2011    Hypertension     Mild Diastolic    Low ferritin level     Osteopenia 11/16/2011    Osteoporosis     Pneumonia     Sleep apnea     Vitamin D deficiencies     Mild      Past Surgical History:   Procedure Laterality Date    ENDOSCOPY, COLON, DIAGNOSTIC  12/4/01    PT DECLINED    [place] ABDOMEN SURGERY PROC UNLISTED  1964    Hysterectomy     Current Outpatient Medications   Medication Sig Dispense Refill    amLODIPine (NORVASC) 5 mg tablet TAKE 1 TABLET EVERY DAY 90 Tablet 3    simvastatin (ZOCOR) 20 mg tablet TAKE 1 TABLET EVERY NIGHT. 90 Tablet 3    latanoprost (XALATAN) 0.005 % ophthalmic solution Administer 1 Drop to both eyes nightly. tiotropium-olodaterol (STIOLTO RESPIMAT) 2.5-2.5 mcg/actuation inhaler Take 2 Puffs by inhalation daily. cholecalciferol, VITAMIN D3, (VITAMIN D3) 5,000 unit tab tablet Take  by mouth daily. albuterol (PROVENTIL HFA, VENTOLIN HFA, PROAIR HFA) 90 mcg/actuation inhaler Take 1 Puff by inhalation every four (4) hours as needed for Wheezing. 1 Inhaler 3    MULTIVIT-MIN/FA/LUTEIN/ZEAXANT (ICAPS MV PO) Take 1 Tab by mouth four (4) times daily. calcium-vitamin D (OS-MONTANA +D3) 500 mg-200 unit per tablet Take 1 Tab by mouth two (2) times daily (with meals). With magnesium        Allergies   Allergen Reactions    Augmentin [Amoxicillin-Pot Clavulanate] Diarrhea     severe    Sulfamerazine Diarrhea       Family History   Problem Relation Age of Onset    Hypertension Mother     Other Mother         CAD    Cancer Mother         Possibly Breast    COPD Father     Cancer Father         Some Form     Social History     Tobacco Use    Smoking status: Never    Smokeless tobacco: Never   Substance Use Topics    Alcohol use:  Yes     Alcohol/week: 5.0 standard drinks     Types: 5 Glasses of wine per week         Mary Singh

## 2022-11-07 NOTE — Clinical Note
Please reach out to patient to schedule an appointment and March 2023. This will be for her follow-up on hypertension.   Thank you

## 2022-11-07 NOTE — PROGRESS NOTES
1. \"Have you been to the ER, urgent care clinic since your last visit? Hospitalized since your last visit? \" No    2. \"Have you seen or consulted any other health care providers outside of the [address] [place] [address] since your last visit? \"  Yes, Pulmonologist with Dr. Malcolm Lozano       3. For patients aged 39-70: Has the patient had a colonoscopy / FIT/ Cologuard? NA - based on age      If the patient is female:    4. For patients aged 41-77: Has the patient had a mammogram within the past 2 years? NA - based on age or sex      11. For patients aged 21-65: Has the patient had a pap smear?  NA - based on age or sex

## 2022-11-07 NOTE — PATIENT INSTRUCTIONS

## 2023-02-19 RX ORDER — AMLODIPINE BESYLATE 5 MG/1
TABLET ORAL
Qty: 90 TABLET | Refills: 2 | Status: SHIPPED | OUTPATIENT
Start: 2023-02-19

## 2023-02-19 RX ORDER — SIMVASTATIN 20 MG
TABLET ORAL
Qty: 90 TABLET | Refills: 2 | Status: SHIPPED | OUTPATIENT
Start: 2023-02-19 | End: 2023-03-19 | Stop reason: SDUPTHER

## 2023-02-20 ENCOUNTER — TELEPHONE (OUTPATIENT)
Age: 83
End: 2023-02-20

## 2023-02-20 NOTE — TELEPHONE ENCOUNTER
Pt called to check status of mal order refills, adv pending provider approval, states will be out of meds soon,   Simvastin and amlodipine

## 2023-02-23 ENCOUNTER — TELEPHONE (OUTPATIENT)
Age: 83
End: 2023-02-23

## 2023-02-23 NOTE — TELEPHONE ENCOUNTER
Spoke with Aníbal Winter at [facility] Scheduling to inquire about nuclear cardiac stress test. Aníbal Winter stated that insurance is pending a clinical review and that scheduled test was cancelled on 08/14/2019 by the authorization team. Aníbal Winter advised to contact insurance company. What Type Of Note Output Would You Prefer (Optional)?: Standard Output How Severe Is Your Skin Lesion?: mild Has Your Skin Lesion Been Treated?: not been treated Is This A New Presentation, Or A Follow-Up?: Skin Lesion

## 2023-03-09 ENCOUNTER — OFFICE VISIT (OUTPATIENT)
Age: 83
End: 2023-03-09
Payer: MEDICARE

## 2023-03-09 VITALS
RESPIRATION RATE: 16 BRPM | HEIGHT: 65 IN | HEART RATE: 71 BPM | OXYGEN SATURATION: 98 % | TEMPERATURE: 98.6 F | DIASTOLIC BLOOD PRESSURE: 78 MMHG | WEIGHT: 145.2 LBS | BODY MASS INDEX: 24.19 KG/M2 | SYSTOLIC BLOOD PRESSURE: 127 MMHG

## 2023-03-09 DIAGNOSIS — I10 ESSENTIAL (PRIMARY) HYPERTENSION: Primary | ICD-10-CM

## 2023-03-09 DIAGNOSIS — E78.00 PURE HYPERCHOLESTEROLEMIA, UNSPECIFIED: ICD-10-CM

## 2023-03-09 PROCEDURE — 3078F DIAST BP <80 MM HG: CPT | Performed by: FAMILY MEDICINE

## 2023-03-09 PROCEDURE — 99213 OFFICE O/P EST LOW 20 MIN: CPT | Performed by: FAMILY MEDICINE

## 2023-03-09 PROCEDURE — 1036F TOBACCO NON-USER: CPT | Performed by: FAMILY MEDICINE

## 2023-03-09 PROCEDURE — 1090F PRES/ABSN URINE INCON ASSESS: CPT | Performed by: FAMILY MEDICINE

## 2023-03-09 PROCEDURE — 1123F ACP DISCUSS/DSCN MKR DOCD: CPT | Performed by: FAMILY MEDICINE

## 2023-03-09 PROCEDURE — G8420 CALC BMI NORM PARAMETERS: HCPCS | Performed by: FAMILY MEDICINE

## 2023-03-09 PROCEDURE — G8482 FLU IMMUNIZE ORDER/ADMIN: HCPCS | Performed by: FAMILY MEDICINE

## 2023-03-09 PROCEDURE — 3074F SYST BP LT 130 MM HG: CPT | Performed by: FAMILY MEDICINE

## 2023-03-09 PROCEDURE — G8427 DOCREV CUR MEDS BY ELIG CLIN: HCPCS | Performed by: FAMILY MEDICINE

## 2023-03-09 PROCEDURE — G8400 PT W/DXA NO RESULTS DOC: HCPCS | Performed by: FAMILY MEDICINE

## 2023-03-09 RX ORDER — LORATADINE AND PSEUDOEPHEDRINE SULFATE 10; 240 MG/1; MG/1
1 TABLET, EXTENDED RELEASE ORAL DAILY
COMMUNITY
Start: 2023-01-03

## 2023-03-09 SDOH — ECONOMIC STABILITY: INCOME INSECURITY: HOW HARD IS IT FOR YOU TO PAY FOR THE VERY BASICS LIKE FOOD, HOUSING, MEDICAL CARE, AND HEATING?: NOT HARD AT ALL

## 2023-03-09 SDOH — ECONOMIC STABILITY: HOUSING INSECURITY
IN THE LAST 12 MONTHS, WAS THERE A TIME WHEN YOU DID NOT HAVE A STEADY PLACE TO SLEEP OR SLEPT IN A SHELTER (INCLUDING NOW)?: NO

## 2023-03-09 SDOH — ECONOMIC STABILITY: FOOD INSECURITY: WITHIN THE PAST 12 MONTHS, YOU WORRIED THAT YOUR FOOD WOULD RUN OUT BEFORE YOU GOT MONEY TO BUY MORE.: NEVER TRUE

## 2023-03-09 SDOH — ECONOMIC STABILITY: FOOD INSECURITY: WITHIN THE PAST 12 MONTHS, THE FOOD YOU BOUGHT JUST DIDN'T LAST AND YOU DIDN'T HAVE MONEY TO GET MORE.: NEVER TRUE

## 2023-03-09 SDOH — ECONOMIC STABILITY: TRANSPORTATION INSECURITY
IN THE PAST 12 MONTHS, HAS LACK OF TRANSPORTATION KEPT YOU FROM MEETINGS, WORK, OR FROM GETTING THINGS NEEDED FOR DAILY LIVING?: NO

## 2023-03-09 ASSESSMENT — PATIENT HEALTH QUESTIONNAIRE - PHQ9
SUM OF ALL RESPONSES TO PHQ QUESTIONS 1-9: 0
2. FEELING DOWN, DEPRESSED OR HOPELESS: 0
1. LITTLE INTEREST OR PLEASURE IN DOING THINGS: 0
SUM OF ALL RESPONSES TO PHQ QUESTIONS 1-9: 0
SUM OF ALL RESPONSES TO PHQ QUESTIONS 1-9: 0
SUM OF ALL RESPONSES TO PHQ9 QUESTIONS 1 & 2: 0
SUM OF ALL RESPONSES TO PHQ QUESTIONS 1-9: 0

## 2023-03-09 NOTE — PROGRESS NOTES
1. \"Have you been to the ER, urgent care clinic since your last visit? Hospitalized since your last visit? \" No    2. \"Have you seen or consulted any other health care providers outside of the [address] [place] [address] since your last visit? \"  Dr. Glenn Rodriguez    3. For patients aged 39-70: Has the patient had a colonoscopy / FIT/ Cologuard? NA - based on age      If the patient is female:    4. For patients aged 41-77: Has the patient had a mammogram within the past 2 years? NA - based on age or sex      11. For patients aged 21-65: Has the patient had a pap smear?  NA - based on age or sex

## 2023-03-09 NOTE — PROGRESS NOTES
HPI:  Sahra Rogers is a 78 y.o. female who presents today with   Chief Complaint   Patient presents with    Hypertension     4 month follow-up            HTN: BP is stable; she is on medications as listed below. She tolerates her medications well. Had COVID a few weeks ago and sx did  improve. She is stable. Pt states she awaken with the skin of the chest being warm. Occurs at midnight. Has been present for some time ( even before COVID) ; she takes the covers off of her and her symptoms improve. Patient has moved to the [facility] area and finds it hard to drive to this office for her medical care. She states that she will be seeking care further at the medical facility that is affiliated with her assisted living facility. She states this is her last visit at this office. Because of this she will hold off on any further blood work at this time. Her last total cholesterol is as listed below.     Lab Results   Component Value Date    CHOL 172 04/06/2022    CHOL 192 11/09/2021    CHOL 197 05/26/2021     Lab Results   Component Value Date    TRIG 107 04/06/2022    TRIG 154 (H) 11/09/2021    TRIG 155 (H) 05/26/2021     Lab Results   Component Value Date    HDL 80 04/06/2022    HDL 78 11/09/2021    HDL 79 05/26/2021     Lab Results   Component Value Date    LDLCALC 73 04/06/2022    LDLCALC 88 11/09/2021    LDLCALC 92 05/26/2021     Lab Results   Component Value Date    LABVLDL 24 08/19/2020    LABVLDL 21 01/24/2020    LABVLDL 34 09/16/2019    VLDL 19 04/06/2022    VLDL 26 11/09/2021    VLDL 26 05/26/2021     No results found for: CHOLHDLRATIO      Lab Results   Component Value Date    CHOL 172 04/06/2022    CHOL 192 11/09/2021    CHOL 197 05/26/2021     Lab Results   Component Value Date    TRIG 107 04/06/2022    TRIG 154 (H) 11/09/2021    TRIG 155 (H) 05/26/2021     Lab Results   Component Value Date    HDL 80 04/06/2022    HDL 78 11/09/2021    HDL 79 05/26/2021     Lab Results   Component Value Date LDLCALC 73 04/06/2022    LDLCALC 88 11/09/2021    LDLCALC 92 05/26/2021     Lab Results   Component Value Date    LABVLDL 24 08/19/2020    LABVLDL 21 01/24/2020    LABVLDL 34 09/16/2019    VLDL 19 04/06/2022    VLDL 26 11/09/2021    VLDL 26 05/26/2021     No results found for: CHOLHDLRATIO      No flowsheet data found. PMH,  Meds, Allergies, Family History, Social history reviewed      Current Outpatient Medications   Medication Sig Dispense Refill    Multiple Vitamins-Minerals (ONE DAILY MULTIVIT-MIN ADULT PO) Take 1 tablet by mouth 4 times daily      loratadine-pseudoephedrine (CLARITIN-D 24 HOUR)  MG per extended release tablet Take 1 tablet by mouth daily      albuterol sulfate HFA (PROVENTIL;VENTOLIN;PROAIR) 108 (90 Base) MCG/ACT inhaler Inhale 1 puff into the lungs every 4 hours as needed      Calcium Carbonate-Vitamin D (OYSTER SHELL CALCIUM/D) 500-5 MG-MCG TABS Take 1 tablet by mouth 2 times daily (with meals)      vitamin D3 (CHOLECALCIFEROL) 125 MCG (5000 UT) TABS tablet Take by mouth daily      latanoprost (XALATAN) 0.005 % ophthalmic solution Apply 1 drop to eye      simvastatin (ZOCOR) 20 MG tablet       tiotropium-olodaterol (STIOLTO) 2.5-2.5 MCG/ACT AERS Inhale 2 puffs into the lungs daily      amLODIPine (NORVASC) 5 MG tablet TAKE 1 TABLET EVERY DAY 90 tablet 2    amLODIPine (NORVASC) 5 MG tablet TAKE 1 TABLET EVERY DAY (Patient not taking: Reported on 3/9/2023)      simvastatin (ZOCOR) 20 MG tablet TAKE 1 TABLET EVERY NIGHT. 90 tablet 2     No current facility-administered medications for this visit.         Allergies   Allergen Reactions    Cephalexin      Other reaction(s): gi distress    Amoxicillin-Pot Clavulanate Diarrhea     severe  Other reaction(s): gi distress  DIARRHEA    Sulfamerazine Diarrhea     Other reaction(s): gi distress                  Review of Systems as per HPI         /78 (Site: Left Upper Arm, Position: Sitting, Cuff Size: Medium Adult)   Pulse 71 Temp 98.6 °F (37 °C) (Temporal)   Resp 16   Ht 5' 5\" (1.651 m)   Wt 145 lb 3.2 oz (65.9 kg)   LMP  (Exact Date) Comment: 1964  SpO2 98%   BMI 24.16 kg/m²       General appearance: alert, cooperative, no distress, appears stated age  Neck: supple, symmetrical, trachea midline, no adenopathy, thyroid: not enlarged, symmetric, no tenderness/mass/nodules, no carotid bruit and no JVD  Lungs: clear to auscultation bilaterally  Heart: regular rate and rhythm, S1, S2 normal, no murmur, click, rub or gallop    Extremities: extremities normal, atraumatic, no cyanosis or edema    Assessment/Plan:    Tito Phillips was seen today for hypertension. Diagnoses and all orders for this visit:    Essential (primary) hypertension    Pure hypercholesterolemia, unspecified      As above  Patient stable  Patient advised that should she need any further assistance until she is able to see her new primary care this provider is certainly available  This has been fully explained to the patient, who indicates understanding. [facility] and pt has been advised of same.        Jigna Snyder MD

## 2024-01-08 NOTE — TELEPHONE ENCOUNTER
Last Visit: 03- OV   Next Appointment: none  Previous Refill Encounter: 02- #90 tabs with 2 refills    Requested Prescriptions     Pending Prescriptions Disp Refills    amLODIPine (NORVASC) 5 MG tablet [Pharmacy Med Name: AMLODIPINE BESYLATE 5 MG Tablet] 90 tablet 3     Sig: TAKE 1 TABLET EVERY DAY

## 2024-01-27 RX ORDER — AMLODIPINE BESYLATE 5 MG/1
TABLET ORAL
Qty: 90 TABLET | Refills: 3 | OUTPATIENT
Start: 2024-01-27